# Patient Record
Sex: MALE | Race: WHITE | NOT HISPANIC OR LATINO | Employment: FULL TIME | URBAN - METROPOLITAN AREA
[De-identification: names, ages, dates, MRNs, and addresses within clinical notes are randomized per-mention and may not be internally consistent; named-entity substitution may affect disease eponyms.]

---

## 2021-12-27 ENCOUNTER — OFFICE VISIT (OUTPATIENT)
Dept: URGENT CARE | Facility: CLINIC | Age: 31
End: 2021-12-27
Payer: COMMERCIAL

## 2021-12-27 VITALS
DIASTOLIC BLOOD PRESSURE: 87 MMHG | WEIGHT: 240 LBS | SYSTOLIC BLOOD PRESSURE: 138 MMHG | RESPIRATION RATE: 18 BRPM | HEIGHT: 72 IN | HEART RATE: 67 BPM | OXYGEN SATURATION: 98 % | BODY MASS INDEX: 32.51 KG/M2 | TEMPERATURE: 98 F

## 2021-12-27 DIAGNOSIS — S39.012A STRAIN OF MUSCLE, FASCIA AND TENDON OF LOWER BACK, INITIAL ENCOUNTER: ICD-10-CM

## 2021-12-27 DIAGNOSIS — M54.9 BACK PAIN, UNSPECIFIED BACK LOCATION, UNSPECIFIED BACK PAIN LATERALITY, UNSPECIFIED CHRONICITY: Primary | ICD-10-CM

## 2021-12-27 PROCEDURE — 3008F PR BODY MASS INDEX (BMI) DOCUMENTED: ICD-10-PCS | Mod: CPTII,S$GLB,,

## 2021-12-27 PROCEDURE — 72070 XR THORACIC SPINE AP LATERAL: ICD-10-PCS | Mod: S$GLB,,, | Performed by: RADIOLOGY

## 2021-12-27 PROCEDURE — 72100 X-RAY EXAM L-S SPINE 2/3 VWS: CPT | Mod: S$GLB,,, | Performed by: RADIOLOGY

## 2021-12-27 PROCEDURE — 3075F SYST BP GE 130 - 139MM HG: CPT | Mod: CPTII,S$GLB,,

## 2021-12-27 PROCEDURE — 96372 PR INJECTION,THERAP/PROPH/DIAG2ST, IM OR SUBCUT: ICD-10-PCS | Mod: S$GLB,,, | Performed by: FAMILY MEDICINE

## 2021-12-27 PROCEDURE — 99203 PR OFFICE/OUTPT VISIT, NEW, LEVL III, 30-44 MIN: ICD-10-PCS | Mod: 25,S$GLB,,

## 2021-12-27 PROCEDURE — 1159F MED LIST DOCD IN RCRD: CPT | Mod: CPTII,S$GLB,,

## 2021-12-27 PROCEDURE — 3079F PR MOST RECENT DIASTOLIC BLOOD PRESSURE 80-89 MM HG: ICD-10-PCS | Mod: CPTII,S$GLB,,

## 2021-12-27 PROCEDURE — 1160F PR REVIEW ALL MEDS BY PRESCRIBER/CLIN PHARMACIST DOCUMENTED: ICD-10-PCS | Mod: CPTII,S$GLB,,

## 2021-12-27 PROCEDURE — 72070 X-RAY EXAM THORAC SPINE 2VWS: CPT | Mod: S$GLB,,, | Performed by: RADIOLOGY

## 2021-12-27 PROCEDURE — 99203 OFFICE O/P NEW LOW 30 MIN: CPT | Mod: 25,S$GLB,,

## 2021-12-27 PROCEDURE — 3079F DIAST BP 80-89 MM HG: CPT | Mod: CPTII,S$GLB,,

## 2021-12-27 PROCEDURE — 1159F PR MEDICATION LIST DOCUMENTED IN MEDICAL RECORD: ICD-10-PCS | Mod: CPTII,S$GLB,,

## 2021-12-27 PROCEDURE — 96372 THER/PROPH/DIAG INJ SC/IM: CPT | Mod: S$GLB,,, | Performed by: FAMILY MEDICINE

## 2021-12-27 PROCEDURE — 3008F BODY MASS INDEX DOCD: CPT | Mod: CPTII,S$GLB,,

## 2021-12-27 PROCEDURE — 1160F RVW MEDS BY RX/DR IN RCRD: CPT | Mod: CPTII,S$GLB,,

## 2021-12-27 PROCEDURE — 3075F PR MOST RECENT SYSTOLIC BLOOD PRESS GE 130-139MM HG: ICD-10-PCS | Mod: CPTII,S$GLB,,

## 2021-12-27 PROCEDURE — 72100 XR LUMBAR SPINE 2 OR 3 VIEWS: ICD-10-PCS | Mod: S$GLB,,, | Performed by: RADIOLOGY

## 2021-12-27 RX ORDER — IBUPROFEN 800 MG/1
800 TABLET ORAL 3 TIMES DAILY
Qty: 21 TABLET | Refills: 0 | Status: SHIPPED | OUTPATIENT
Start: 2021-12-27 | End: 2022-01-03

## 2021-12-27 RX ORDER — KETOROLAC TROMETHAMINE 30 MG/ML
30 INJECTION, SOLUTION INTRAMUSCULAR; INTRAVENOUS
Status: COMPLETED | OUTPATIENT
Start: 2021-12-27 | End: 2021-12-27

## 2021-12-27 RX ORDER — TIZANIDINE 4 MG/1
4 TABLET ORAL EVERY 6 HOURS PRN
Qty: 20 TABLET | Refills: 0 | Status: SHIPPED | OUTPATIENT
Start: 2021-12-27 | End: 2022-01-01

## 2021-12-27 RX ADMIN — KETOROLAC TROMETHAMINE 30 MG: 30 INJECTION, SOLUTION INTRAMUSCULAR; INTRAVENOUS at 05:12

## 2022-01-08 ENCOUNTER — LAB VISIT (OUTPATIENT)
Dept: PRIMARY CARE CLINIC | Facility: OTHER | Age: 32
End: 2022-01-08
Attending: INTERNAL MEDICINE
Payer: COMMERCIAL

## 2022-01-08 DIAGNOSIS — Z11.52 ENCOUNTER FOR SCREENING FOR COVID-19: Primary | ICD-10-CM

## 2022-01-08 PROCEDURE — U0003 INFECTIOUS AGENT DETECTION BY NUCLEIC ACID (DNA OR RNA); SEVERE ACUTE RESPIRATORY SYNDROME CORONAVIRUS 2 (SARS-COV-2) (CORONAVIRUS DISEASE [COVID-19]), AMPLIFIED PROBE TECHNIQUE, MAKING USE OF HIGH THROUGHPUT TECHNOLOGIES AS DESCRIBED BY CMS-2020-01-R: HCPCS | Performed by: INTERNAL MEDICINE

## 2022-01-08 NOTE — PROGRESS NOTES
Subjective:       Patient ID: Danish Guzman is a 31 y.o. male.    Chief Complaint: No chief complaint on file.    HPI  Review of Systems    Objective:      Physical Exam    Assessment:       1. Encounter for screening for COVID-19           swab collected, consent given     Plan:

## 2022-01-10 LAB
SARS-COV-2 RNA RESP QL NAA+PROBE: NOT DETECTED
SARS-COV-2- CYCLE NUMBER: NORMAL

## 2023-03-16 ENCOUNTER — HOSPITAL ENCOUNTER (EMERGENCY)
Facility: HOSPITAL | Age: 33
Discharge: HOME | End: 2023-03-17
Attending: STUDENT IN AN ORGANIZED HEALTH CARE EDUCATION/TRAINING PROGRAM
Payer: COMMERCIAL

## 2023-03-16 VITALS
BODY MASS INDEX: 32.51 KG/M2 | HEIGHT: 72 IN | WEIGHT: 240 LBS | OXYGEN SATURATION: 99 % | SYSTOLIC BLOOD PRESSURE: 148 MMHG | HEART RATE: 62 BPM | TEMPERATURE: 98 F | DIASTOLIC BLOOD PRESSURE: 63 MMHG | RESPIRATION RATE: 16 BRPM

## 2023-03-16 DIAGNOSIS — R07.9 CHEST PAIN: ICD-10-CM

## 2023-03-16 DIAGNOSIS — R00.2 PALPITATIONS: Primary | ICD-10-CM

## 2023-03-16 LAB
ALBUMIN SERPL BCP-MCNC: 4.3 G/DL (ref 3.5–5.2)
ALP SERPL-CCNC: 62 U/L (ref 55–135)
ALT SERPL W/O P-5'-P-CCNC: 40 U/L (ref 10–44)
ANION GAP SERPL CALC-SCNC: 9 MMOL/L (ref 8–16)
AST SERPL-CCNC: 22 U/L (ref 10–40)
BASOPHILS # BLD AUTO: 0.05 K/UL (ref 0–0.2)
BASOPHILS NFR BLD: 0.6 % (ref 0–1.9)
BILIRUB SERPL-MCNC: 0.5 MG/DL (ref 0.1–1)
BILIRUB UR QL STRIP: NEGATIVE
BNP SERPL-MCNC: <10 PG/ML (ref 0–99)
BUN SERPL-MCNC: 14 MG/DL (ref 6–20)
CALCIUM SERPL-MCNC: 9.5 MG/DL (ref 8.7–10.5)
CHLORIDE SERPL-SCNC: 104 MMOL/L (ref 95–110)
CLARITY UR REFRACT.AUTO: CLEAR
CO2 SERPL-SCNC: 25 MMOL/L (ref 23–29)
COLOR UR AUTO: YELLOW
CREAT SERPL-MCNC: 0.8 MG/DL (ref 0.5–1.4)
D DIMER PPP IA.FEU-MCNC: <0.19 MG/L FEU
DIFFERENTIAL METHOD: NORMAL
EOSINOPHIL # BLD AUTO: 0.2 K/UL (ref 0–0.5)
EOSINOPHIL NFR BLD: 2.3 % (ref 0–8)
ERYTHROCYTE [DISTWIDTH] IN BLOOD BY AUTOMATED COUNT: 12 % (ref 11.5–14.5)
EST. GFR  (NO RACE VARIABLE): >60 ML/MIN/1.73 M^2
GLUCOSE SERPL-MCNC: 98 MG/DL (ref 70–110)
GLUCOSE UR QL STRIP: NEGATIVE
HCT VFR BLD AUTO: 43.9 % (ref 40–54)
HCV AB SERPL QL IA: NORMAL
HGB BLD-MCNC: 15.1 G/DL (ref 14–18)
HGB UR QL STRIP: NEGATIVE
HIV 1+2 AB+HIV1 P24 AG SERPL QL IA: NORMAL
IMM GRANULOCYTES # BLD AUTO: 0.03 K/UL (ref 0–0.04)
IMM GRANULOCYTES NFR BLD AUTO: 0.3 % (ref 0–0.5)
KETONES UR QL STRIP: NEGATIVE
LEUKOCYTE ESTERASE UR QL STRIP: NEGATIVE
LYMPHOCYTES # BLD AUTO: 2.3 K/UL (ref 1–4.8)
LYMPHOCYTES NFR BLD: 25.8 % (ref 18–48)
MAGNESIUM SERPL-MCNC: 1.7 MG/DL (ref 1.6–2.6)
MCH RBC QN AUTO: 29.3 PG (ref 27–31)
MCHC RBC AUTO-ENTMCNC: 34.4 G/DL (ref 32–36)
MCV RBC AUTO: 85 FL (ref 82–98)
MONOCYTES # BLD AUTO: 0.6 K/UL (ref 0.3–1)
MONOCYTES NFR BLD: 7 % (ref 4–15)
NEUTROPHILS # BLD AUTO: 5.7 K/UL (ref 1.8–7.7)
NEUTROPHILS NFR BLD: 64 % (ref 38–73)
NITRITE UR QL STRIP: NEGATIVE
NRBC BLD-RTO: 0 /100 WBC
PH UR STRIP: 6 [PH] (ref 5–8)
PLATELET # BLD AUTO: 233 K/UL (ref 150–450)
PMV BLD AUTO: 10.1 FL (ref 9.2–12.9)
POTASSIUM SERPL-SCNC: 3.7 MMOL/L (ref 3.5–5.1)
PROT SERPL-MCNC: 7.9 G/DL (ref 6–8.4)
PROT UR QL STRIP: NEGATIVE
RBC # BLD AUTO: 5.16 M/UL (ref 4.6–6.2)
SODIUM SERPL-SCNC: 138 MMOL/L (ref 136–145)
SP GR UR STRIP: 1.01 (ref 1–1.03)
TROPONIN I SERPL DL<=0.01 NG/ML-MCNC: <0.006 NG/ML (ref 0–0.03)
TSH SERPL DL<=0.005 MIU/L-ACNC: 1.75 UIU/ML (ref 0.4–4)
URN SPEC COLLECT METH UR: NORMAL
WBC # BLD AUTO: 8.98 K/UL (ref 3.9–12.7)

## 2023-03-16 PROCEDURE — 86803 HEPATITIS C AB TEST: CPT | Performed by: STUDENT IN AN ORGANIZED HEALTH CARE EDUCATION/TRAINING PROGRAM

## 2023-03-16 PROCEDURE — 80053 COMPREHEN METABOLIC PANEL: CPT | Performed by: STUDENT IN AN ORGANIZED HEALTH CARE EDUCATION/TRAINING PROGRAM

## 2023-03-16 PROCEDURE — 85025 COMPLETE CBC W/AUTO DIFF WBC: CPT | Performed by: STUDENT IN AN ORGANIZED HEALTH CARE EDUCATION/TRAINING PROGRAM

## 2023-03-16 PROCEDURE — 93010 ELECTROCARDIOGRAM REPORT: CPT | Mod: ,,, | Performed by: INTERNAL MEDICINE

## 2023-03-16 PROCEDURE — 99285 EMERGENCY DEPT VISIT HI MDM: CPT | Mod: 25

## 2023-03-16 PROCEDURE — 81003 URINALYSIS AUTO W/O SCOPE: CPT | Performed by: STUDENT IN AN ORGANIZED HEALTH CARE EDUCATION/TRAINING PROGRAM

## 2023-03-16 PROCEDURE — 99284 PR EMERGENCY DEPT VISIT,LEVEL IV: ICD-10-PCS | Mod: ,,, | Performed by: STUDENT IN AN ORGANIZED HEALTH CARE EDUCATION/TRAINING PROGRAM

## 2023-03-16 PROCEDURE — 83880 ASSAY OF NATRIURETIC PEPTIDE: CPT | Performed by: STUDENT IN AN ORGANIZED HEALTH CARE EDUCATION/TRAINING PROGRAM

## 2023-03-16 PROCEDURE — 87389 HIV-1 AG W/HIV-1&-2 AB AG IA: CPT | Performed by: STUDENT IN AN ORGANIZED HEALTH CARE EDUCATION/TRAINING PROGRAM

## 2023-03-16 PROCEDURE — 93005 ELECTROCARDIOGRAM TRACING: CPT

## 2023-03-16 PROCEDURE — 93010 EKG 12-LEAD: ICD-10-PCS | Mod: ,,, | Performed by: INTERNAL MEDICINE

## 2023-03-16 PROCEDURE — 83735 ASSAY OF MAGNESIUM: CPT | Performed by: STUDENT IN AN ORGANIZED HEALTH CARE EDUCATION/TRAINING PROGRAM

## 2023-03-16 PROCEDURE — 85379 FIBRIN DEGRADATION QUANT: CPT | Performed by: STUDENT IN AN ORGANIZED HEALTH CARE EDUCATION/TRAINING PROGRAM

## 2023-03-16 PROCEDURE — 84443 ASSAY THYROID STIM HORMONE: CPT | Performed by: STUDENT IN AN ORGANIZED HEALTH CARE EDUCATION/TRAINING PROGRAM

## 2023-03-16 PROCEDURE — 84484 ASSAY OF TROPONIN QUANT: CPT | Performed by: STUDENT IN AN ORGANIZED HEALTH CARE EDUCATION/TRAINING PROGRAM

## 2023-03-16 PROCEDURE — 99284 EMERGENCY DEPT VISIT MOD MDM: CPT | Mod: ,,, | Performed by: STUDENT IN AN ORGANIZED HEALTH CARE EDUCATION/TRAINING PROGRAM

## 2023-03-17 LAB — TROPONIN I SERPL DL<=0.01 NG/ML-MCNC: <0.006 NG/ML (ref 0–0.03)

## 2023-03-17 NOTE — DISCHARGE INSTRUCTIONS
Please follow up with your primary care doctor. If your symptoms worsen or you have any concerns, you should return to the Emergency Department for re-evaluation. A cardiology referral has also been placed for you today. You should receive a call to schedule a follow up appointment.

## 2023-03-17 NOTE — ED PROVIDER NOTES
"Encounter Date: 3/16/2023       History     Chief Complaint   Patient presents with    Chest Pain     Episode on Friday, pressure in midsternal chest, +dizziness. "Felt like panic attack." Episode resolved on it own. Today had another episode today, similar to friday, this time reports feeling SOB. Reports palpitations.      HPI  Patient is a 32-year-old male with no significant past medical history who presents for chest pain and palpitations.  Patient states that his symptoms have been intermittent for the past week.  He states that the symptoms usually resolve after a couple minutes.  He states that the 1st time this happened he did feel lightheaded but symptoms resolved by 5 minutes.  He has had this happen a few more times with the most recent being prior to arrival.  Today his symptoms lasted longer than usual but by the time he arrived to the emergency department he does report that it is mostly resolved.  He only had lightheadedness with the 1st episode.  He states that it feels more like a pressure in his chest and that his heart is racing.  He does state that he has been checking his heart rate during these episodes and that is usually 60-80 beats per minute.  No associated shortness of breath.  He denies new medications and drug use.  He states that he drinks about 2-3 cups of coffee every day but has not had an increase in his caffeine intake.  No family history of sudden cardiac death.    Review of patient's allergies indicates:  No Known Allergies  History reviewed. No pertinent past medical history.  Past Surgical History:   Procedure Laterality Date    TONSILLECTOMY Bilateral      History reviewed. No pertinent family history.  Social History     Tobacco Use    Smoking status: Never    Smokeless tobacco: Never   Substance Use Topics    Alcohol use: Yes     Alcohol/week: 3.0 standard drinks     Types: 1 Glasses of wine, 1 Cans of beer, 1 Shots of liquor per week     Comment: OCCASIONALLY    Drug use: " Never     Review of Systems  Constitutional: No fever, no chills  HENT: No sore throat  Eyes: No eye pain  Respiratory: No shortness of breath, no cough  Cardiovascular:  Positive chest pain, positive palpitations  Gastrointestinal: No abdominal pain, no nausea, no vomiting, no diarrhea  Genitourinary: No dysuria  Musculoskeletal: No back pain  Neurological: No headache  Psychiatric: No agitation     Physical Exam     Initial Vitals   BP Pulse Resp Temp SpO2   03/16/23 2004 03/16/23 2004 03/16/23 2004 03/16/23 2006 03/16/23 2004   (!) 140/81 72 15 98.1 °F (36.7 °C) 98 %      MAP       --                Physical Exam  Constitutional: No acute distress, well appear  Respiratory: Non-labored, lungs clear  Cardiovascular: Well perfused, normal rate, regular rhythm  Gastrointestinal: Soft, non-tender, non-distended  Integumentary: Warm and dry  Musculoskeletal: No deformity, moving all extremities well  Neurological: Awake and alert  Psychiatric: Cooperative       ED Course   Procedures  Labs Reviewed   HIV 1 / 2 ANTIBODY    Narrative:     Release to patient->Immediate   HEPATITIS C ANTIBODY    Narrative:     Release to patient->Immediate   CBC W/ AUTO DIFFERENTIAL   COMPREHENSIVE METABOLIC PANEL   B-TYPE NATRIURETIC PEPTIDE   D DIMER, QUANTITATIVE   MAGNESIUM   TROPONIN I   TSH   URINALYSIS, REFLEX TO URINE CULTURE    Narrative:     Specimen Source->Urine   TROPONIN I          Imaging Results              X-Ray Chest PA And Lateral (Final result)  Result time 03/16/23 23:08:26      Final result by Magdy Walker MD (03/16/23 23:08:26)                   Impression:      No acute process.      Electronically signed by: Magdy Walker MD  Date:    03/16/2023  Time:    23:08               Narrative:    EXAMINATION:  XR CHEST PA AND LATERAL    CLINICAL HISTORY:  Chest pain, unspecified    TECHNIQUE:  PA and lateral views of the chest were performed.    COMPARISON:  None    FINDINGS:  The trachea is unremarkable.  The  cardiomediastinal silhouette is within normal limits.  The hilar structures are unremarkable.  There is no evidence of free air beneath the hemidiaphragms.  There are no pleural effusions.  There is no evidence of a pneumothorax.  There is no evidence of pneumomediastinum.  No airspace opacity is present.  The osseous structures are unremarkable.                                       Medications - No data to display  Medical Decision Making:   Clinical Tests:   Lab Tests: Ordered and Reviewed  Radiological Study: Ordered and Reviewed  Medical Tests: Ordered and Reviewed  ED Management:  Patient is a previous healthy 32-year-old male who presents for intermittent chest pain and palpitations.  EKG here shows normal sinus rhythm, rate 66, no red me a or STEMI.  Symptoms have resolved upon arrival.  Labs ordered to assess for possible etiologies of palpitations and chest discomfort.  Chest x-ray also ordered.  Will continue to monitor in the emergency department.  Anticipate discharge home with cardiology referral if workup negative.    Labs and imaging reviewed and reassuring. Patient resting comfortably on re-evaluation.  He is instructed to follow up with his primary care doctor and also provided a cardiology referral.  He was counseled on return precautions prior to discharge.  Patient was comfortable discharge home.           ED Course as of 03/17/23 0150   Thu Mar 16, 2023   2134 EKG with normal sinus rhythm, rate 66, no STEMI.  EKG independently interpreted by me. [NN]      ED Course User Index  [NN] Shilpa Damon MD                 Clinical Impression:   Final diagnoses:  [R07.9] Chest pain  [R00.2] Palpitations (Primary)        ED Disposition Condition    Discharge Stable          ED Prescriptions    None       Follow-up Information       Follow up With Specialties Details Why Contact Info Additional Information    Chetan Garcia [facility] Schedule an appointment as soon as possible for a  visit   [address] [id number] [phone] [facility] is located across the street from the [facility], behind the [facility], and between the Pediatrics and [facility] facilities on [address] [facility]. [facility] - Emergency Dept Emergency Medicine  As needed, If symptoms worsen [address] [id number] [phone]     [facility] - Cardiology - [facility] Cardiology Schedule an appointment as soon as possible for a visit   [address] [id number] [phone] Cardiology Services Clinics - 3rd floor             Shilpa Damon MD  03/17/23 0153

## 2023-03-17 NOTE — ED TRIAGE NOTES
"Chest has been feeling "wanky" since Friday, with chills and goosebumps and feeling tingly. States chest tightness, with sob, no n/v/d nor headaches or blurred vision.     No past medical history on file.    No past surgical history on file.    No family history on file.    Social History     Socioeconomic History    Marital status: Single   Tobacco Use    Smoking status: Never    Smokeless tobacco: Never   Substance and Sexual Activity    Alcohol use: Yes     Comment: OCCASIONALLY    Drug use: Never    Sexual activity: Not Currently       No current facility-administered medications for this encounter.     No current outpatient medications on file.       Review of patient's allergies indicates:  No Known Allergies   "

## 2023-03-20 ENCOUNTER — TELEPHONE (OUTPATIENT)
Dept: PRIMARY CARE CLINIC | Facility: CLINIC | Age: 33
End: 2023-03-20
Payer: COMMERCIAL

## 2023-03-20 NOTE — TELEPHONE ENCOUNTER
----- Message from Caryn Wong sent at 3/17/2023  5:17 PM CDT -----  Type:  Needs Medical Advice    Who Called: pt  Symptoms (please be specific): pt needs to be scheduled for appt  to est care   Would the patient rather a call back or a response via [employer]? call  Best Call Back Number: [phone]  Additional Information:

## 2023-03-21 ENCOUNTER — TELEPHONE (OUTPATIENT)
Dept: INTERNAL MEDICINE | Facility: CLINIC | Age: 33
End: 2023-03-21
Payer: COMMERCIAL

## 2023-03-21 NOTE — TELEPHONE ENCOUNTER
Received a message that pt wanted to schedule a np est care visit , pt is scheduled to see a provider 4/4 to establish care . He declines an appointment with  at this time

## 2023-03-21 NOTE — TELEPHONE ENCOUNTER
----- Message from Ursula Ferguson LPN sent at 3/21/2023 10:28 AM CDT -----  Regarding: Referral  Good morning,     Can someone please contact the pt to schedule an appt to est.care.      Thanks,  Ursula

## 2023-04-03 NOTE — PROGRESS NOTES
Subjective     Patient ID: aDnish Guzman is a 32 y.o. male.    Chief Complaint: Annual Exam and Establish Care    Pt new to me, here to establish care    Previous PCP--has not had one    Went to ER on 3-16-23 for palpitations and chest pain, cardiac workup negative. Told to f/u with PCP and Cardiology. I have independently reviewed the ER documentation from this visit. Still has the chest pain 2-3 times a day with or without exertion.    Review of Systems   Constitutional:  Negative for activity change, appetite change and unexpected weight change.   HENT:  Negative for hearing loss and voice change.    Eyes:  Negative for visual disturbance.   Respiratory:  Negative for apnea, cough, chest tightness and shortness of breath.    Cardiovascular:  Positive for chest pain. Negative for palpitations and leg swelling.        Seen in ER on 3/16/23, cardiac workup, happens 2-3 times a day   Gastrointestinal:  Negative for abdominal distention, abdominal pain, blood in stool, constipation, diarrhea, nausea, vomiting and reflux.   Endocrine: Negative for cold intolerance, heat intolerance, polydipsia, polyphagia and polyuria.   Genitourinary:  Negative for difficulty urinating, dysuria and penile pain.   Musculoskeletal:  Negative for arthralgias and myalgias.   Integumentary:  Negative for color change, pallor, rash and wound.   Allergic/Immunologic: Negative for environmental allergies and food allergies.   Neurological:  Positive for numbness. Negative for dizziness, weakness, light-headedness and headaches.   Hematological:  Negative for adenopathy. Does not bruise/bleed easily.   Psychiatric/Behavioral:  Negative for agitation, behavioral problems, sleep disturbance and suicidal ideas.      Review of patient's allergies indicates:  No Known Allergies     No current outpatient medications on file.     There is no problem list on file for this patient.     History reviewed. No pertinent past medical history.     Past  Surgical History:   Procedure Laterality Date    TONSILLECTOMY Bilateral       Social History     Socioeconomic History    Marital status: Single   Tobacco Use    Smoking status: Never    Smokeless tobacco: Never   Substance and Sexual Activity    Alcohol use: Yes     Alcohol/week: 3.0 standard drinks     Types: 1 Glasses of wine, 1 Cans of beer, 1 Shots of liquor per week     Comment: OCCASIONALLY    Drug use: Never    Sexual activity: Yes     Partners: Female      Family History   Problem Relation Age of Onset    Diabetes Mother     Stroke Mother     No Known Problems Father          Objective   Vitals:    04/04/23 0808   BP: 120/70   Pulse: 71   SpO2: 98%   Weight: 109.6 kg (241 lb 10 oz)   Height: 6' (1.829 m)   PainSc:   3   PainLoc: Chest      Body mass index is 32.77 kg/m².     Physical Exam  Vitals and nursing note reviewed.   Constitutional:       Appearance: He is well-developed. He is obese.   HENT:      Head: Normocephalic.      Right Ear: Tympanic membrane, ear canal and external ear normal. There is no impacted cerumen.      Left Ear: Tympanic membrane, ear canal and external ear normal. There is no impacted cerumen.      Nose: Nose normal.      Mouth/Throat:      Mouth: Mucous membranes are moist.      Pharynx: Oropharynx is clear.   Eyes:      General: Lids are normal. Lids are everted, no foreign bodies appreciated.      Extraocular Movements: Extraocular movements intact.      Conjunctiva/sclera: Conjunctivae normal.      Pupils: Pupils are equal, round, and reactive to light.   Neck:      Vascular: No carotid bruit or JVD.      Trachea: Trachea normal.   Cardiovascular:      Rate and Rhythm: Normal rate and regular rhythm.      Pulses: Normal pulses.      Heart sounds: Normal heart sounds.   Pulmonary:      Effort: Pulmonary effort is normal.      Breath sounds: Normal breath sounds.   Abdominal:      General: Abdomen is flat. Bowel sounds are normal.      Palpations: Abdomen is soft.    Musculoskeletal:         General: Normal range of motion.      Cervical back: Full passive range of motion without pain, normal range of motion and neck supple.   Skin:     General: Skin is warm and dry.      Capillary Refill: Capillary refill takes less than 2 seconds.   Neurological:      General: No focal deficit present.      Mental Status: He is alert and oriented to person, place, and time.   Psychiatric:         Mood and Affect: Mood normal.         Speech: Speech normal.         Behavior: Behavior normal.         Thought Content: Thought content normal.         Judgment: Judgment normal.          Assessment and Plan     Problem List Items Addressed This Visit    None  Visit Diagnoses       Encounter for health maintenance examination    -  Primary    Routine lab draw        Relevant Orders    LIPID PANEL    CBC W/ AUTO DIFFERENTIAL    COMPREHENSIVE METABOLIC PANEL    TSH    Screening cholesterol level        Relevant Orders    LIPID PANEL    Need for Tdap vaccination        Relevant Orders    (In Office Administered) Tdap Vaccine    Encounter to establish care with new doctor        BMI 32.0-32.9,adult        Obesity (BMI 30-39.9)        Chest pain, unspecified type        Relevant Orders    Ambulatory referral/consult to Cardiology            Danish was seen today for annual exam and establish care.    Diagnoses and all orders for this visit:    Encounter for health maintenance examination  Annual wellness exam completed.    All medications, histories, and concerns reviewed, reconciled, and addressed.    Appropriate Screenings per pt's sex and age have been reviewed and discussed with pt.    BMI reviewed.    Routine lab draw  -     LIPID PANEL; Future  -     CBC W/ AUTO DIFFERENTIAL; Future  -     COMPREHENSIVE METABOLIC PANEL; Future  -     TSH; Future    Screening cholesterol level  -     LIPID PANEL; Future    Need for Tdap vaccination  -     (In Office Administered) Tdap Vaccine    Encounter to  establish care with new doctor  Fasting lab orders, will call with results, if results ok, RTC in 1 yr for annual or sooner prn with one of MDs I work with who can be your new PCP: Dr. Ml Joe, Dr. Kaley Johnston, Dr. Joseph Wong    BMI 32.0-32.9,adult  BMI reviewed    Obesity (BMI 30-39.9)  BMI reviewed.    Diet and exercise to lose weight.    Chest pain, unspecified type  -     Ambulatory referral/consult to Cardiology; Future    Fasting lab orders, will call with results, if results ok, RTC in 1 yr for annual or sooner prn with one of MDs I work with who can be your new PCP: Dr. Ml Joe, Dr. Kaley Johnston, Dr. Joseph Wong    Tdap vaccine    Cardiology referral for chest pain    Follow up in about 1 year (around 4/4/2024) for annual or sooner as needed with one of MDs recommended on AVS.

## 2023-04-04 ENCOUNTER — LAB VISIT (OUTPATIENT)
Dept: LAB | Facility: HOSPITAL | Age: 33
End: 2023-04-04
Payer: COMMERCIAL

## 2023-04-04 ENCOUNTER — OFFICE VISIT (OUTPATIENT)
Dept: INTERNAL MEDICINE | Facility: CLINIC | Age: 33
End: 2023-04-04
Payer: COMMERCIAL

## 2023-04-04 VITALS
SYSTOLIC BLOOD PRESSURE: 120 MMHG | OXYGEN SATURATION: 98 % | HEART RATE: 71 BPM | DIASTOLIC BLOOD PRESSURE: 70 MMHG | BODY MASS INDEX: 32.73 KG/M2 | HEIGHT: 72 IN | WEIGHT: 241.63 LBS

## 2023-04-04 DIAGNOSIS — Z01.89 ROUTINE LAB DRAW: ICD-10-CM

## 2023-04-04 DIAGNOSIS — R07.9 CHEST PAIN, UNSPECIFIED TYPE: ICD-10-CM

## 2023-04-04 DIAGNOSIS — Z76.89 ENCOUNTER TO ESTABLISH CARE WITH NEW DOCTOR: ICD-10-CM

## 2023-04-04 DIAGNOSIS — Z23 NEED FOR TDAP VACCINATION: ICD-10-CM

## 2023-04-04 DIAGNOSIS — Z13.220 SCREENING CHOLESTEROL LEVEL: ICD-10-CM

## 2023-04-04 DIAGNOSIS — E66.9 OBESITY (BMI 30-39.9): ICD-10-CM

## 2023-04-04 DIAGNOSIS — Z00.00 ENCOUNTER FOR HEALTH MAINTENANCE EXAMINATION: Primary | ICD-10-CM

## 2023-04-04 LAB
ALBUMIN SERPL BCP-MCNC: 3.9 G/DL (ref 3.5–5.2)
ALP SERPL-CCNC: 52 U/L (ref 55–135)
ALT SERPL W/O P-5'-P-CCNC: 50 U/L (ref 10–44)
ANION GAP SERPL CALC-SCNC: 10 MMOL/L (ref 8–16)
AST SERPL-CCNC: 27 U/L (ref 10–40)
BASOPHILS # BLD AUTO: 0.06 K/UL (ref 0–0.2)
BASOPHILS NFR BLD: 0.8 % (ref 0–1.9)
BILIRUB SERPL-MCNC: 0.6 MG/DL (ref 0.1–1)
BUN SERPL-MCNC: 14 MG/DL (ref 6–20)
CALCIUM SERPL-MCNC: 9.4 MG/DL (ref 8.7–10.5)
CHLORIDE SERPL-SCNC: 103 MMOL/L (ref 95–110)
CHOLEST SERPL-MCNC: 182 MG/DL (ref 120–199)
CHOLEST/HDLC SERPL: 3 {RATIO} (ref 2–5)
CO2 SERPL-SCNC: 25 MMOL/L (ref 23–29)
CREAT SERPL-MCNC: 0.8 MG/DL (ref 0.5–1.4)
DIFFERENTIAL METHOD: ABNORMAL
EOSINOPHIL # BLD AUTO: 0.4 K/UL (ref 0–0.5)
EOSINOPHIL NFR BLD: 4.4 % (ref 0–8)
ERYTHROCYTE [DISTWIDTH] IN BLOOD BY AUTOMATED COUNT: 12.5 % (ref 11.5–14.5)
EST. GFR  (NO RACE VARIABLE): >60 ML/MIN/1.73 M^2
GLUCOSE SERPL-MCNC: 94 MG/DL (ref 70–110)
HCT VFR BLD AUTO: 43.5 % (ref 40–54)
HDLC SERPL-MCNC: 60 MG/DL (ref 40–75)
HDLC SERPL: 33 % (ref 20–50)
HGB BLD-MCNC: 14.6 G/DL (ref 14–18)
IMM GRANULOCYTES # BLD AUTO: 0.05 K/UL (ref 0–0.04)
IMM GRANULOCYTES NFR BLD AUTO: 0.6 % (ref 0–0.5)
LDLC SERPL CALC-MCNC: 106.8 MG/DL (ref 63–159)
LYMPHOCYTES # BLD AUTO: 2.3 K/UL (ref 1–4.8)
LYMPHOCYTES NFR BLD: 29.6 % (ref 18–48)
MCH RBC QN AUTO: 28.9 PG (ref 27–31)
MCHC RBC AUTO-ENTMCNC: 33.6 G/DL (ref 32–36)
MCV RBC AUTO: 86 FL (ref 82–98)
MONOCYTES # BLD AUTO: 0.6 K/UL (ref 0.3–1)
MONOCYTES NFR BLD: 7 % (ref 4–15)
NEUTROPHILS # BLD AUTO: 4.5 K/UL (ref 1.8–7.7)
NEUTROPHILS NFR BLD: 57.6 % (ref 38–73)
NONHDLC SERPL-MCNC: 122 MG/DL
NRBC BLD-RTO: 0 /100 WBC
PLATELET # BLD AUTO: 230 K/UL (ref 150–450)
PMV BLD AUTO: 10.6 FL (ref 9.2–12.9)
POTASSIUM SERPL-SCNC: 4.1 MMOL/L (ref 3.5–5.1)
PROT SERPL-MCNC: 7.1 G/DL (ref 6–8.4)
RBC # BLD AUTO: 5.05 M/UL (ref 4.6–6.2)
SODIUM SERPL-SCNC: 138 MMOL/L (ref 136–145)
TRIGL SERPL-MCNC: 76 MG/DL (ref 30–150)
TSH SERPL DL<=0.005 MIU/L-ACNC: 1.67 UIU/ML (ref 0.4–4)
WBC # BLD AUTO: 7.87 K/UL (ref 3.9–12.7)

## 2023-04-04 PROCEDURE — 3074F PR MOST RECENT SYSTOLIC BLOOD PRESSURE < 130 MM HG: ICD-10-PCS | Mod: CPTII,S$GLB,, | Performed by: NURSE PRACTITIONER

## 2023-04-04 PROCEDURE — 80053 COMPREHEN METABOLIC PANEL: CPT | Performed by: NURSE PRACTITIONER

## 2023-04-04 PROCEDURE — 99999 PR PBB SHADOW E&M-EST. PATIENT-LVL IV: CPT | Mod: PBBFAC,,, | Performed by: NURSE PRACTITIONER

## 2023-04-04 PROCEDURE — 99385 PREV VISIT NEW AGE 18-39: CPT | Mod: 25,S$GLB,, | Performed by: NURSE PRACTITIONER

## 2023-04-04 PROCEDURE — 3008F PR BODY MASS INDEX (BMI) DOCUMENTED: ICD-10-PCS | Mod: CPTII,S$GLB,, | Performed by: NURSE PRACTITIONER

## 2023-04-04 PROCEDURE — 99385 PR PREVENTIVE VISIT,NEW,18-39: ICD-10-PCS | Mod: 25,S$GLB,, | Performed by: NURSE PRACTITIONER

## 2023-04-04 PROCEDURE — 3078F PR MOST RECENT DIASTOLIC BLOOD PRESSURE < 80 MM HG: ICD-10-PCS | Mod: CPTII,S$GLB,, | Performed by: NURSE PRACTITIONER

## 2023-04-04 PROCEDURE — 84443 ASSAY THYROID STIM HORMONE: CPT | Performed by: NURSE PRACTITIONER

## 2023-04-04 PROCEDURE — 3074F SYST BP LT 130 MM HG: CPT | Mod: CPTII,S$GLB,, | Performed by: NURSE PRACTITIONER

## 2023-04-04 PROCEDURE — 80061 LIPID PANEL: CPT | Performed by: NURSE PRACTITIONER

## 2023-04-04 PROCEDURE — 1159F PR MEDICATION LIST DOCUMENTED IN MEDICAL RECORD: ICD-10-PCS | Mod: CPTII,S$GLB,, | Performed by: NURSE PRACTITIONER

## 2023-04-04 PROCEDURE — 90471 TDAP VACCINE GREATER THAN OR EQUAL TO 7YO IM: ICD-10-PCS | Mod: S$GLB,,, | Performed by: NURSE PRACTITIONER

## 2023-04-04 PROCEDURE — 90715 TDAP VACCINE 7 YRS/> IM: CPT | Mod: S$GLB,,, | Performed by: NURSE PRACTITIONER

## 2023-04-04 PROCEDURE — 90471 IMMUNIZATION ADMIN: CPT | Mod: S$GLB,,, | Performed by: NURSE PRACTITIONER

## 2023-04-04 PROCEDURE — 85025 COMPLETE CBC W/AUTO DIFF WBC: CPT | Performed by: NURSE PRACTITIONER

## 2023-04-04 PROCEDURE — 36415 COLL VENOUS BLD VENIPUNCTURE: CPT | Performed by: NURSE PRACTITIONER

## 2023-04-04 PROCEDURE — 3078F DIAST BP <80 MM HG: CPT | Mod: CPTII,S$GLB,, | Performed by: NURSE PRACTITIONER

## 2023-04-04 PROCEDURE — 3008F BODY MASS INDEX DOCD: CPT | Mod: CPTII,S$GLB,, | Performed by: NURSE PRACTITIONER

## 2023-04-04 PROCEDURE — 90715 TDAP VACCINE GREATER THAN OR EQUAL TO 7YO IM: ICD-10-PCS | Mod: S$GLB,,, | Performed by: NURSE PRACTITIONER

## 2023-04-04 PROCEDURE — 99999 PR PBB SHADOW E&M-EST. PATIENT-LVL IV: ICD-10-PCS | Mod: PBBFAC,,, | Performed by: NURSE PRACTITIONER

## 2023-04-04 PROCEDURE — 1159F MED LIST DOCD IN RCRD: CPT | Mod: CPTII,S$GLB,, | Performed by: NURSE PRACTITIONER

## 2023-04-04 NOTE — PATIENT INSTRUCTIONS
Fasting lab orders, will call with results, if results ok, RTC in 1 yr for annual or sooner prn with one of MDs I work with who can be your new PCP: Dr. Ml Joe, Dr. Kaley Johnston, Dr. Joseph Wong    Tdap vaccine    Cardiology referral for chest pain

## 2023-04-06 ENCOUNTER — OFFICE VISIT (OUTPATIENT)
Dept: CARDIOLOGY | Facility: CLINIC | Age: 33
End: 2023-04-06
Payer: COMMERCIAL

## 2023-04-06 VITALS
HEIGHT: 72 IN | BODY MASS INDEX: 31.83 KG/M2 | SYSTOLIC BLOOD PRESSURE: 129 MMHG | DIASTOLIC BLOOD PRESSURE: 77 MMHG | WEIGHT: 235 LBS | RESPIRATION RATE: 20 BRPM | HEART RATE: 73 BPM

## 2023-04-06 DIAGNOSIS — R07.9 CHEST PAIN: ICD-10-CM

## 2023-04-06 DIAGNOSIS — R07.9 CHEST PAIN, UNSPECIFIED TYPE: ICD-10-CM

## 2023-04-06 DIAGNOSIS — R00.2 PALPITATIONS: ICD-10-CM

## 2023-04-06 PROCEDURE — 3074F PR MOST RECENT SYSTOLIC BLOOD PRESSURE < 130 MM HG: ICD-10-PCS | Mod: CPTII,S$GLB,, | Performed by: INTERNAL MEDICINE

## 2023-04-06 PROCEDURE — 1160F PR REVIEW ALL MEDS BY PRESCRIBER/CLIN PHARMACIST DOCUMENTED: ICD-10-PCS | Mod: CPTII,S$GLB,, | Performed by: INTERNAL MEDICINE

## 2023-04-06 PROCEDURE — 3078F DIAST BP <80 MM HG: CPT | Mod: CPTII,S$GLB,, | Performed by: INTERNAL MEDICINE

## 2023-04-06 PROCEDURE — 99999 PR PBB SHADOW E&M-EST. PATIENT-LVL III: ICD-10-PCS | Mod: PBBFAC,,, | Performed by: INTERNAL MEDICINE

## 2023-04-06 PROCEDURE — 1159F PR MEDICATION LIST DOCUMENTED IN MEDICAL RECORD: ICD-10-PCS | Mod: CPTII,S$GLB,, | Performed by: INTERNAL MEDICINE

## 2023-04-06 PROCEDURE — 1159F MED LIST DOCD IN RCRD: CPT | Mod: CPTII,S$GLB,, | Performed by: INTERNAL MEDICINE

## 2023-04-06 PROCEDURE — 99204 OFFICE O/P NEW MOD 45 MIN: CPT | Mod: S$GLB,,, | Performed by: INTERNAL MEDICINE

## 2023-04-06 PROCEDURE — 99204 PR OFFICE/OUTPT VISIT, NEW, LEVL IV, 45-59 MIN: ICD-10-PCS | Mod: S$GLB,,, | Performed by: INTERNAL MEDICINE

## 2023-04-06 PROCEDURE — 1160F RVW MEDS BY RX/DR IN RCRD: CPT | Mod: CPTII,S$GLB,, | Performed by: INTERNAL MEDICINE

## 2023-04-06 PROCEDURE — 3008F PR BODY MASS INDEX (BMI) DOCUMENTED: ICD-10-PCS | Mod: CPTII,S$GLB,, | Performed by: INTERNAL MEDICINE

## 2023-04-06 PROCEDURE — 99999 PR PBB SHADOW E&M-EST. PATIENT-LVL III: CPT | Mod: PBBFAC,,, | Performed by: INTERNAL MEDICINE

## 2023-04-06 PROCEDURE — 3074F SYST BP LT 130 MM HG: CPT | Mod: CPTII,S$GLB,, | Performed by: INTERNAL MEDICINE

## 2023-04-06 PROCEDURE — 3008F BODY MASS INDEX DOCD: CPT | Mod: CPTII,S$GLB,, | Performed by: INTERNAL MEDICINE

## 2023-04-06 PROCEDURE — 3078F PR MOST RECENT DIASTOLIC BLOOD PRESSURE < 80 MM HG: ICD-10-PCS | Mod: CPTII,S$GLB,, | Performed by: INTERNAL MEDICINE

## 2023-04-06 NOTE — PROGRESS NOTES
HISTORY:    33 yo M w no significant PMH presenting for initial evaluation by me.    Pt reports a recent CP syndrome. 1st episode March 9th. Described as a hard heart beat with palpitations followed by some chest discomfort and a feeling of swelling in his throat. Symptoms usually last minutes at a time. He has to stop what he;'s doing. Always self-resolving. Occurring every couple of days since. No SOB or CUEVA.    Pt reports very good exercise levels at baseline. Walks his dogs in the mornings and runs in his evening. 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     The patient denies any previous history of myocardial infarction, coronary artery disease, peripheral arterial disease, stroke, congestive heart failure, or cardiomyopathy. Nonsmoker.      PHYSICAL EXAM:    Vitals:    04/06/23 0834   BP: 129/77   Pulse: 73   Resp: 20       NAD, A+Ox3.  No jvd, no bruit.  RRR nml s1,s2. No murmurs.  CTA B no wheezes or crackles.  No edema.    LABS/STUDIES (imaging reviewed during clinic visit):    April 2023 CBC and CMP normal.   and HDL 60.  Triglycerides 76. March 2023 Troponin and BNP negative.  TSH normal.    ECG March 2023 sinus rhythm with no Q-waves or ST changes.  Chest x-ray March 2023 clear with normal cardiac borders.      ASSESSMENT & PLAN:    1. Chest pain    2. Palpitations    3. Chest pain, unspecified type        Orders Placed This Encounter    Holter monitor - 48 hour    Echo        Pt with palpitations and some non-specific symptoms. Check a holter and TTE. Pt did have an episode of syncope in [facility] in January in the setting of a considerable amount of hiking and dehydration. No episodes since.     Follow up in about 4 weeks (around 5/4/2023).      Edvin Woodard MD

## 2023-10-05 ENCOUNTER — OFFICE VISIT (OUTPATIENT)
Dept: CARDIOLOGY | Facility: CLINIC | Age: 33
End: 2023-10-05
Payer: COMMERCIAL

## 2023-10-05 VITALS
BODY MASS INDEX: 33.02 KG/M2 | HEIGHT: 72 IN | HEART RATE: 84 BPM | SYSTOLIC BLOOD PRESSURE: 123 MMHG | DIASTOLIC BLOOD PRESSURE: 83 MMHG | WEIGHT: 243.81 LBS

## 2023-10-05 DIAGNOSIS — R00.2 PALPITATIONS: Primary | ICD-10-CM

## 2023-10-05 PROCEDURE — 99999 PR PBB SHADOW E&M-EST. PATIENT-LVL III: CPT | Mod: PBBFAC,,, | Performed by: INTERNAL MEDICINE

## 2023-10-05 PROCEDURE — 1159F PR MEDICATION LIST DOCUMENTED IN MEDICAL RECORD: ICD-10-PCS | Mod: CPTII,S$GLB,, | Performed by: INTERNAL MEDICINE

## 2023-10-05 PROCEDURE — 1160F PR REVIEW ALL MEDS BY PRESCRIBER/CLIN PHARMACIST DOCUMENTED: ICD-10-PCS | Mod: CPTII,S$GLB,, | Performed by: INTERNAL MEDICINE

## 2023-10-05 PROCEDURE — 3008F BODY MASS INDEX DOCD: CPT | Mod: CPTII,S$GLB,, | Performed by: INTERNAL MEDICINE

## 2023-10-05 PROCEDURE — 3074F SYST BP LT 130 MM HG: CPT | Mod: CPTII,S$GLB,, | Performed by: INTERNAL MEDICINE

## 2023-10-05 PROCEDURE — 3079F PR MOST RECENT DIASTOLIC BLOOD PRESSURE 80-89 MM HG: ICD-10-PCS | Mod: CPTII,S$GLB,, | Performed by: INTERNAL MEDICINE

## 2023-10-05 PROCEDURE — 1159F MED LIST DOCD IN RCRD: CPT | Mod: CPTII,S$GLB,, | Performed by: INTERNAL MEDICINE

## 2023-10-05 PROCEDURE — 3079F DIAST BP 80-89 MM HG: CPT | Mod: CPTII,S$GLB,, | Performed by: INTERNAL MEDICINE

## 2023-10-05 PROCEDURE — 99214 PR OFFICE/OUTPT VISIT, EST, LEVL IV, 30-39 MIN: ICD-10-PCS | Mod: S$GLB,,, | Performed by: INTERNAL MEDICINE

## 2023-10-05 PROCEDURE — 99999 PR PBB SHADOW E&M-EST. PATIENT-LVL III: ICD-10-PCS | Mod: PBBFAC,,, | Performed by: INTERNAL MEDICINE

## 2023-10-05 PROCEDURE — 1160F RVW MEDS BY RX/DR IN RCRD: CPT | Mod: CPTII,S$GLB,, | Performed by: INTERNAL MEDICINE

## 2023-10-05 PROCEDURE — 99214 OFFICE O/P EST MOD 30 MIN: CPT | Mod: S$GLB,,, | Performed by: INTERNAL MEDICINE

## 2023-10-05 PROCEDURE — 3074F PR MOST RECENT SYSTOLIC BLOOD PRESSURE < 130 MM HG: ICD-10-PCS | Mod: CPTII,S$GLB,, | Performed by: INTERNAL MEDICINE

## 2023-10-05 PROCEDURE — 3008F PR BODY MASS INDEX (BMI) DOCUMENTED: ICD-10-PCS | Mod: CPTII,S$GLB,, | Performed by: INTERNAL MEDICINE

## 2023-10-05 NOTE — PROGRESS NOTES
HISTORY:    33-year-old male with no cardiac history presenting for follow-up.    Pt initially evaluated for palpitations and chest discomfort in May. Ordered a TTE and holter that did not occur. Symptoms improved and then recurred. More so palpitations described as a hard heart beat with palpitations followed by some chest discomfort. Palpitations lasting an hour. Worst when he lays down at night. No SOB or CUEVA.     Pt reports very good exercise levels at baseline. Walks his dogs in the mornings and runs in his evening. 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 Has backed off running due to concern about symptoms.      The patient denies any previous history of myocardial infarction, coronary artery disease, peripheral arterial disease, stroke, congestive heart failure, or cardiomyopathy. Nonsmoker.       PHYSICAL EXAM:    Vitals:    10/05/23 1500   BP: 123/83   Pulse: 84       NAD, A+Ox3.  No jvd, no bruit.  RRR nml s1,s2. No murmurs.  CTA B no wheezes or crackles.  No edema.    LABS/STUDIES (imaging reviewed during clinic visit):    April 2023 CBC and CMP normal.   and HDL 60.  Triglycerides 76.  BNP and troponin normal.  TSH normal.    March 2023 ECG sinus rhythm no Q-waves or ST changes.    Chest x-ray March 2023 normal cardiac silhouette.  Clear lung fields.        ASSESSMENT & PLAN:    1. Palpitations        Orders Placed This Encounter    Echo        Palpitations with non-specific discomfort. Reschedule TTE/holter and reassess thereafter.     Follow up in about 3 months (around 1/5/2024).      Edvin Woodard MD

## 2023-10-06 ENCOUNTER — PATIENT MESSAGE (OUTPATIENT)
Dept: BEHAVIORAL HEALTH | Facility: CLINIC | Age: 33
End: 2023-10-06
Payer: COMMERCIAL

## 2023-10-06 ENCOUNTER — OFFICE VISIT (OUTPATIENT)
Dept: INTERNAL MEDICINE | Facility: CLINIC | Age: 33
End: 2023-10-06
Payer: COMMERCIAL

## 2023-10-06 VITALS
DIASTOLIC BLOOD PRESSURE: 80 MMHG | HEART RATE: 65 BPM | SYSTOLIC BLOOD PRESSURE: 126 MMHG | WEIGHT: 242.31 LBS | BODY MASS INDEX: 32.82 KG/M2 | HEIGHT: 72 IN | OXYGEN SATURATION: 98 %

## 2023-10-06 DIAGNOSIS — E66.9 OBESITY (BMI 30-39.9): ICD-10-CM

## 2023-10-06 DIAGNOSIS — R00.2 PALPITATIONS: ICD-10-CM

## 2023-10-06 DIAGNOSIS — F51.01 PRIMARY INSOMNIA: ICD-10-CM

## 2023-10-06 DIAGNOSIS — R07.9 CHEST PAIN IN ADULT: Primary | ICD-10-CM

## 2023-10-06 DIAGNOSIS — F32.A MODERATELY SEVERE DEPRESSION: ICD-10-CM

## 2023-10-06 PROCEDURE — 1159F MED LIST DOCD IN RCRD: CPT | Mod: CPTII,S$GLB,, | Performed by: NURSE PRACTITIONER

## 2023-10-06 PROCEDURE — 99999 PR PBB SHADOW E&M-EST. PATIENT-LVL IV: CPT | Mod: PBBFAC,,, | Performed by: NURSE PRACTITIONER

## 2023-10-06 PROCEDURE — 99999 PR PBB SHADOW E&M-EST. PATIENT-LVL IV: ICD-10-PCS | Mod: PBBFAC,,, | Performed by: NURSE PRACTITIONER

## 2023-10-06 PROCEDURE — 3008F BODY MASS INDEX DOCD: CPT | Mod: CPTII,S$GLB,, | Performed by: NURSE PRACTITIONER

## 2023-10-06 PROCEDURE — 1160F RVW MEDS BY RX/DR IN RCRD: CPT | Mod: CPTII,S$GLB,, | Performed by: NURSE PRACTITIONER

## 2023-10-06 PROCEDURE — 1159F PR MEDICATION LIST DOCUMENTED IN MEDICAL RECORD: ICD-10-PCS | Mod: CPTII,S$GLB,, | Performed by: NURSE PRACTITIONER

## 2023-10-06 PROCEDURE — 3074F PR MOST RECENT SYSTOLIC BLOOD PRESSURE < 130 MM HG: ICD-10-PCS | Mod: CPTII,S$GLB,, | Performed by: NURSE PRACTITIONER

## 2023-10-06 PROCEDURE — 1160F PR REVIEW ALL MEDS BY PRESCRIBER/CLIN PHARMACIST DOCUMENTED: ICD-10-PCS | Mod: CPTII,S$GLB,, | Performed by: NURSE PRACTITIONER

## 2023-10-06 PROCEDURE — 3008F PR BODY MASS INDEX (BMI) DOCUMENTED: ICD-10-PCS | Mod: CPTII,S$GLB,, | Performed by: NURSE PRACTITIONER

## 2023-10-06 PROCEDURE — 3079F DIAST BP 80-89 MM HG: CPT | Mod: CPTII,S$GLB,, | Performed by: NURSE PRACTITIONER

## 2023-10-06 PROCEDURE — 99214 PR OFFICE/OUTPT VISIT, EST, LEVL IV, 30-39 MIN: ICD-10-PCS | Mod: S$GLB,,, | Performed by: NURSE PRACTITIONER

## 2023-10-06 PROCEDURE — 3079F PR MOST RECENT DIASTOLIC BLOOD PRESSURE 80-89 MM HG: ICD-10-PCS | Mod: CPTII,S$GLB,, | Performed by: NURSE PRACTITIONER

## 2023-10-06 PROCEDURE — 99214 OFFICE O/P EST MOD 30 MIN: CPT | Mod: S$GLB,,, | Performed by: NURSE PRACTITIONER

## 2023-10-06 PROCEDURE — 3074F SYST BP LT 130 MM HG: CPT | Mod: CPTII,S$GLB,, | Performed by: NURSE PRACTITIONER

## 2023-10-06 RX ORDER — TRAZODONE HYDROCHLORIDE 50 MG/1
50 TABLET ORAL NIGHTLY
Qty: 30 TABLET | Refills: 0 | Status: SHIPPED | OUTPATIENT
Start: 2023-10-06 | End: 2023-10-30

## 2023-10-06 NOTE — PATIENT INSTRUCTIONS
Advised to complete workup per Cardiology for the chest pain that was ordered on yesterday by them during that visit    [facility]. Message me in 4 weeks or call me to let me know how your mood and sleep are doing. Be sure to take pill to allow 8 hrs of sleep. If you do well on this will send additional refills once you check in with me in 4 weeks    Referral to Behavioral Health.    You can call [phone] to schedule your appt with Psychiatry or Behavioral Health per my referral.

## 2023-10-06 NOTE — PROGRESS NOTES
"Subjective     Patient ID: Danish Guzman is a 33 y.o. male.    Chief Complaint: Chest Pain and Insomnia    Pt here for, "my heartbeat". Also Complains of insomnia. Normally sleeps 5 hrs a night. He tried otc Nyquil and Tylenol PM and that resulted in 8-9 hours of sleep. He has avoided caffeine. Also would like to see a therapist for possible stress and depression. Works during the day and sometimes night. Shifts rotate at times. Works 5 days a week, 10 hr shifts. Denies excess snoring at night.    Saw Cardiology on yesterday for a follow up for same issue. Plan of care was the following:  ASSESSMENT & PLAN:   1. Palpitations    Orders Placed This Encounter  · Echo     Palpitations with non-specific discomfort. Reschedule TTE/holter and reassess thereafter.      Follow up in about 3 months (around 1/5/2024).      Edvin Woodard MD    Prior to, I saw pt on 4-4-23 for annual and a follow up on CP from an ER visit in March. His annual labs were stable. I referred him to Cardiology then for the chest pain. He saw them also on 4-6-23 and did not complete the workup. Their plan of care then was the following:  ASSESSMENT & PLAN:  1. Chest pain   2. Palpitations   3. Chest pain, unspecified type      Orders Placed This Encounter  · Holter monitor - 48 hour  · Echo     Pt with palpitations and some non-specific symptoms. Check a holter and TTE. Pt did have an episode of syncope in [facility] in January in the setting of a considerable amount of hiking and dehydration. No episodes since.      Follow up in about 4 weeks (around 5/4/2023).      Edvin Woodard MD      Review of Systems   Constitutional:  Negative for chills, fatigue and fever.   Respiratory:  Negative for chest tightness and shortness of breath.    Cardiovascular:  Positive for chest pain and palpitations.        As documented in HPI     Gastrointestinal:  Negative for abdominal pain, constipation, diarrhea and vomiting.   Genitourinary:  Negative for dysuria. "   Musculoskeletal:  Negative for arthralgias, gait problem and myalgias.   Allergic/Immunologic: Negative for environmental allergies, food allergies and frequent infections.   Neurological:  Negative for dizziness, syncope, weakness, light-headedness, numbness and headaches.   Hematological:  Negative for adenopathy. Does not bruise/bleed easily.   Psychiatric/Behavioral:  Positive for dysphoric mood and sleep disturbance.         As documented in HPI       Review of patient's allergies indicates:  No Known Allergies    No current outpatient medications on file.    There is no problem list on file for this patient.      No past medical history on file.    Past Surgical History:   Procedure Laterality Date    TONSILLECTOMY Bilateral        Social History     Socioeconomic History    Marital status: Single   Tobacco Use    Smoking status: Never    Smokeless tobacco: Never   Substance and Sexual Activity    Alcohol use: Yes     Alcohol/week: 3.0 standard drinks of alcohol     Types: 1 Glasses of wine, 1 Cans of beer, 1 Shots of liquor per week     Comment: OCCASIONALLY    Drug use: Never    Sexual activity: Yes     Partners: Female       Family History   Problem Relation Age of Onset    Diabetes Mother     Stroke Mother     No Known Problems Father        Objective     Vitals:    10/06/23 0805   BP: 126/80   Pulse: 65   SpO2: 98%   Weight: 109.9 kg (242 lb 4.6 oz)   Height: 6' (1.829 m)   PainSc:   1     Body mass index is 32.86 kg/m².    Physical Exam  Vitals and nursing note reviewed.   Constitutional:       Appearance: He is obese.   HENT:      Head: Normocephalic.      Mouth/Throat:      Mouth: Mucous membranes are moist.   Eyes:      Conjunctiva/sclera: Conjunctivae normal.   Neck:      Thyroid: No thyroid mass, thyromegaly or thyroid tenderness.   Cardiovascular:      Rate and Rhythm: Normal rate and regular rhythm.      Pulses: Normal pulses.      Heart sounds: No murmur heard.     No gallop.   Pulmonary:       Effort: Pulmonary effort is normal.      Breath sounds: Normal breath sounds.   Musculoskeletal:         General: Normal range of motion.      Cervical back: Normal range of motion.   Skin:     General: Skin is warm and dry.      Capillary Refill: Capillary refill takes less than 2 seconds.   Neurological:      General: No focal deficit present.      Mental Status: He is alert and oriented to person, place, and time.   Psychiatric:         Mood and Affect: Mood is depressed. Affect is flat.         Behavior: Behavior normal.         Thought Content: Thought content normal.         Judgment: Judgment normal.             10/6/2023     8:15 AM 4/6/2023     8:35 AM 4/4/2023     8:08 AM   Depression Patient Health Questionnaire   Over the last two weeks how often have you been bothered by little interest or pleasure in doing things Several days Not at all Not at all   Over the last two weeks how often have you been bothered by feeling down, depressed or hopeless More than half the days Not at all Not at all   PHQ-2 Total Score 3 0 0   Over the last two weeks how often have you been bothered by trouble falling or staying asleep, or sleeping too much Nearly every day     Over the last two weeks how often have you been bothered by feeling tired or having little energy Nearly every day     Over the last two weeks how often have you been bothered by a poor appetite or overeating Several days     Over the last two weeks how often have you been bothered by feeling bad about yourself - or that you are a failure or have let yourself or your family down Nearly every day     Over the last two weeks how often have you been bothered by trouble concentrating on things, such as reading the newspaper or watching television Several days     Over the last two weeks how often have you been bothered by moving or speaking so slowly that other people could have noticed. Or the opposite - being so fidgety or restless that you have been moving  around a lot more than usual. Not at all     Over the last two weeks how often have you been bothered by thoughts that you would be better off dead, or of hurting yourself Several days     If you checked off any problems, how difficult have these problems made it for you to do your work, take care of things at home or get along with other people? Somewhat difficult     PHQ-9 Score 15     PHQ-9 Interpretation Moderately Severe              Assessment and Plan     1. Chest pain in adult  2. Palpitations  Advised to complete workup per Cardiology for the chest pain that was ordered on yesterday by them during that visit    3. Primary insomnia  -     Ambulatory referral/consult to Primary Care Behavioral Health (Non-Opioids); Future; Expected date: 10/06/2023  -     traZODone (DESYREL) 50 MG tablet; Take 1 tablet (50 mg total) by mouth every evening.  Dispense: 30 tablet; Refill: 0    4. Moderately severe depression  -     Ambulatory referral/consult to Primary Care Behavioral Health (Non-Opioids); Future; Expected date: 10/06/2023  -     traZODone (DESYREL) 50 MG tablet; Take 1 tablet (50 mg total) by mouth every evening.  Dispense: 30 tablet; Refill: 0    5. BMI 32.0-32.9,adult    6. Obesity (BMI 30-39.9)    Start trazodone 50mg at nighit for sleep and depression. Message me in 4 weeks or call me to let me know how your mood and sleep are doing. Be sure to take pill to allow 8 hrs of sleep. If you do well on this will send additional refills once you check in with me in 4 weeks    Referral to Behavioral Health.    You can call [phone] to schedule your appt with Psychiatry or Behavioral Health per my referral.    Self care instructions provided in AVS    Follow up if symptoms worsen or fail to improve.

## 2023-10-10 ENCOUNTER — TELEPHONE (OUTPATIENT)
Dept: BEHAVIORAL HEALTH | Facility: CLINIC | Age: 33
End: 2023-10-10
Payer: COMMERCIAL

## 2023-10-10 NOTE — PROGRESS NOTES
Behavioral Health Community Health Worker  Initial Assessment  Completed by:  Juliana Middleton    Date:  10/10/2023    Patient Enrollment in Behavioral Health Program:  Patient verbalized understanding of Behavioral Health Integration services to include:  Patient understands that CHW, LCSW, PharmD and consulting Psychiatrist are members of the care team working collaboratively with his/her primary care provider: Yes  Patient understands that activation of their [facility] patient portal account is required for accessing the full scope of team services: Yes  Patient understands that some counseling sessions may occur via video: Yes  Clinic visits with the psychiatrist may be subject to a co-pay based on your insurance: Yes  Patient consents to enroll in BHI program: Yes    Assessments     Single Item Health Literacy Scale:  How often do you need to have someone help you read instructions, pamphlets or other written material from your doctor or pharmacy?: Never    Promis 10:  Promis 10 Responses  In general, would you say your health is: Good  In general, would you say your quality of life is: Good  In general, how would you rate your physical health?: Good  In general, how would you rate your mental health, including your mood and your ability to think?: Good  In general, how would you rate your satisfaction with your social activities and relationships?: Fair  In general, please rate how well you carry out your usual social activities and roles. (This includes activities at home, at work and in your community, and responsibilities as a parent, child, spouse, employee, friend, etc.): Good  To what extent are you able to carry out your everyday physical activities such as walking, climbing stairs, carrying groceries, or moving a chair? : Completely  How often have you been bothered by emotional problems such as feeling anxious, depressed or irritable?: Often  In the past 7 days, how would you rate your fatigue on  average?: Moderate  In the past 7 days, on a scale of 0 to 10 (where 0 is no pain and 10 is the worst pain imaginable) how would you rate your pain on average?: 4  Global Physical Health: 15  Global Mental health Score: 12    Depression PHQ:      10/10/2023    10:57 AM 10/6/2023     8:15 AM 4/6/2023     8:35 AM 4/4/2023     8:08 AM   PHQ-9 Depression Patient Health Questionnaire   Over the last two weeks how often have you been bothered by little interest or pleasure in doing things 2 1 0 0   Over the last two weeks how often have you been bothered by feeling down, depressed or hopeless 1 2 0 0   Over the last two weeks how often have you been bothered by trouble falling or staying asleep, or sleeping too much 3 3     Over the last two weeks how often have you been bothered by feeling tired or having little energy 1 3     Over the last two weeks how often have you been bothered by a poor appetite or overeating 3 1     Over the last two weeks how often have you been bothered by feeling bad about yourself - or that you are a failure or have let yourself or your family down 2 3     Over the last two weeks how often have you been bothered by trouble concentrating on things, such as reading the newspaper or watching television 1 1     Over the last two weeks how often have you been bothered by moving or speaking so slowly that other people could have noticed. 0 0     Over the last two weeks how often have you been bothered by thoughts that you would be better off dead, or of hurting yourself 0 1     If you checked off any problems, how difficult have these problems made it for you to do your work, take care of things at home or get along with other people? Somewhat difficult Somewhat difficult     PHQ-9 Score 13 15            Generalized Anxiety Disorder 7-Item Scale:      10/10/2023    11:01 AM   GAD7   1. Feeling nervous, anxious, or on edge? 1   2. Not being able to stop or control worrying? 2   3. Worrying too much  "about different things? 2   4. Trouble relaxing? 1   5. Being so restless that it is hard to sit still? 2   6. Becoming easily annoyed or irritable? 1   7. Feeling afraid as if something awful might happen? 3   8. If you checked off any problems, how difficult have these problems made it for you to do your work, take care of things at home, or get along with other people? 1   MYLES-7 Score 12       History     Social History     Socioeconomic History    Marital status: Single   Tobacco Use    Smoking status: Never    Smokeless tobacco: Never   Substance and Sexual Activity    Alcohol use: Yes     Alcohol/week: 3.0 standard drinks of alcohol     Types: 1 Glasses of wine, 1 Cans of beer, 1 Shots of liquor per week     Comment: OCCASIONALLY    Drug use: Never    Sexual activity: Yes     Partners: Female       Call Summary   Patient was referred to the BHI (Non-opioid) program by Primary Care Provider, Dr. Stern CHW contacted Danish Guzman who reports depression that limits [his/her] activities of daily living (ADLs).   Patient scored "15" on the PHQ9 and "12" on the MYLES 7. Based on these scores patient is eligible for the Behavioral health Integration (Non-opioid) Program. TERIW completed the intake and scheduled an appointment for patient with Jo Florence LCSW, on 10/24/23.          "

## 2023-10-24 ENCOUNTER — OFFICE VISIT (OUTPATIENT)
Dept: BEHAVIORAL HEALTH | Facility: CLINIC | Age: 33
End: 2023-10-24
Payer: COMMERCIAL

## 2023-10-24 DIAGNOSIS — F41.0 PANIC DISORDER: ICD-10-CM

## 2023-10-24 DIAGNOSIS — F51.01 PRIMARY INSOMNIA: ICD-10-CM

## 2023-10-24 DIAGNOSIS — F32.1 MDD (MAJOR DEPRESSIVE DISORDER), SINGLE EPISODE, MODERATE: Primary | ICD-10-CM

## 2023-10-24 PROCEDURE — 90791 PR PSYCHIATRIC DIAGNOSTIC EVALUATION: ICD-10-PCS | Mod: S$GLB,,,

## 2023-10-24 PROCEDURE — 90791 PSYCH DIAGNOSTIC EVALUATION: CPT | Mod: S$GLB,,,

## 2023-10-24 PROCEDURE — 99999 PR PBB SHADOW E&M-EST. PATIENT-LVL I: ICD-10-PCS | Mod: PBBFAC,,,

## 2023-10-24 PROCEDURE — 99999 PR PBB SHADOW E&M-EST. PATIENT-LVL I: CPT | Mod: PBBFAC,,,

## 2023-10-24 NOTE — PROGRESS NOTES
Primary Care Behavioral Health Integration: Initial  Date:  10/24/2023  Referral Source:  Dory Stern DNP  Type of Visit:  In person  Length of Appointment: 45 minutes   .  History of Present Illness:  Danish Guzman, a 33 y.o. male with history of depression referred by Dory Stern DNP.  Patient was seen, examined and chart was reviewed.    Met with patient.     Pt was seen for an initial evaluation. Pt reported that he was seeking therapy due to having chest pain. Pt reported that he had been having chest pain since March 2023. Pt reported being concerned as his family had a history of strokes. Pt reported that he had several tests done to determine the cause of his chest pain; however, all tests came back normal. Pt reported having no change in the amount of stress that he had been under. Pt reported having experienced the following symptoms in March to include chest pain, pounding heart, numbness and tingling sensations, dizziness, and difficulty catching his breath. Pt also reported that he had experience blurred vision. Pt also reported having chest pain at night, which contributed to difficulty sleeping. Pt reported when having the chest pains, it is difficult to stay focused as he would have thoughts about his health including dying. Pt reported that these chest pains happen randomly. Pt reported that he found that running water and tapping his fingers helped to calm him down. SW educated the pt on panic attacks and the associated symptoms. 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 Pt reported being  for 7 years with no kids.       Current symptoms:  Depression: dysphoric mood, anhedonia, insomnia, hopelessness, difficulty concentrating, and increased appetite.  Anxiety: panic attacks.  Sleep: non-restful sleep and difficulty going back to sleep after awakening .  [place]:  denies.  Psychosis: denies .    Risk assessment:  Patient reports no suicidal  ideation  Patient reports no homicidal ideation  Patient reports no self-injurious behavior  Patient reports no violent behavior    Patient advised to call [phone] or present the the nearest ED if they experience suicidal or homicidal ideation, plan or intent.      No past medical history on file.      Current Outpatient Medications:     traZODone (DESYREL) 50 MG tablet, Take 1 tablet (50 mg total) by mouth every evening., Disp: 30 tablet, Rfl: 0    Psychiatric History:  Is the patient taking psychiatric medication: Yes  Pt was currently taking Trazodone 50 mg, for which he found helpful with sleep. They may be interested in medication changes.  Previous Medication Trials: No  Previous Psychiatric Outpatient Treatment:  No  Previous Psychiatric Hospitalizations:  No  Previous Suicide Attempts:  No  History of Trauma:  No  Access to a Firearm:  Yes, pt confirmed firearm was in a safe at parents house.    Substance Use History:  Tobacco/Nicotine:  No   Alcohol: 1-2 times a week (5-6 drinks)  Illicit Substances: No  Misuse of Prescription Medications:  No  Caffeine: Yes - Coffee (1 cup) per day, which was a decrease from 4 cups.     Mental Status Exam  General Appearance:  unremarkable, age appropriate, casually dressed   Speech: normal tone, normal rate, normal pitch, normal volume      Level of Cooperation: cooperative      Thought Processes: normal and logical   Mood: Appropriate       Thought Content: normal, no suicidality, no homicidality, delusions, or paranoia   Affect: congruent and appropriate   Orientation: Oriented x3   Memory: recent >  intact, remote >  intact   Attention Span & Concentration: intact   Fund of General Knowledge: intact and appropriate to age and level of education   Abstract Reasoning: intact   Judgment & Insight: fair     Language  intact           10/10/2023    11:01 AM   GAD7   1. Feeling nervous, anxious, or on edge? 1   2. Not being able to stop or control worrying? 2   3. Worrying  too much about different things? 2   4. Trouble relaxing? 1   5. Being so restless that it is hard to sit still? 2   6. Becoming easily annoyed or irritable? 1   7. Feeling afraid as if something awful might happen? 3   8. If you checked off any problems, how difficult have these problems made it for you to do your work, take care of things at home, or get along with other people? 1   MYLES-7 Score 12        Impression:   My diagnostic impression is Anxiety disorders; panic disorder [F41.0] and MAJOR DEPRESSIVE DISORDER, SINGLE EPISODE, Moderate (F32.1).       Treatment Goals and Plan: Initial appointment focused on gathering history, identifying treatment goals and developing a treatment plan.      Depression: acquiring relapse prevention skills  Panic: acquiring breathing skills, acquiring relapse prevention skills, and reducing physical symptoms of anxiety/panic    SW will send to the pt via [facility] the following material: What Is Depression; What Are Panic Attacks.    Future treatment will utilize CBT, Mindfulness Techniques, and Relaxation Techniques .      Return to Clinic: 2 weeks

## 2023-10-25 ENCOUNTER — PATIENT MESSAGE (OUTPATIENT)
Dept: BEHAVIORAL HEALTH | Facility: CLINIC | Age: 33
End: 2023-10-25
Payer: COMMERCIAL

## 2023-10-27 NOTE — PROGRESS NOTES
Patient discussed during [facility] meeting today, 10/27/2023.  Patient with poorly controlled depression and anxiety.  He currently undergoing Cardiology workup for chest pain and palpitations (like due to anxiety).  He is considering option of medication.  I would recommend Zoloft 25mg daily x 1 week and then 50mg daily.  LCSW will discuss with him at next appointment.  He is also taking Trazodone 50mg qHS for insomnia and this has been helpful.    Time: 5 minutes    The above treatment considerations and suggestions are based on consultations with the patients Behavioral Health Integration therapist and a review of information available in the patient's chart.  I have not personally examined the patient.  All recommendations should be implemented with consideration of the patients relevant prior history and current clinical status.  It remains the responsibility of the patient's Primary Care Physician to prescribe medications and to educate the patient on risks versus benefits, alternative treatments, side effect profile and the inherent unpredictability of individual responses to medications.  Please feel free to call me with any questions about the care of this patient.

## 2023-11-03 ENCOUNTER — CLINICAL SUPPORT (OUTPATIENT)
Dept: CARDIOLOGY | Facility: HOSPITAL | Age: 33
End: 2023-11-03
Attending: INTERNAL MEDICINE
Payer: COMMERCIAL

## 2023-11-03 DIAGNOSIS — R00.2 PALPITATIONS: ICD-10-CM

## 2023-11-03 PROCEDURE — 93226 XTRNL ECG REC<48 HR SCAN A/R: CPT

## 2023-11-03 PROCEDURE — 93227 XTRNL ECG REC<48 HR R&I: CPT | Mod: ,,, | Performed by: INTERNAL MEDICINE

## 2023-11-03 PROCEDURE — 93227 HOLTER MONITOR - 48 HOUR (CUPID ONLY): ICD-10-PCS | Mod: ,,, | Performed by: INTERNAL MEDICINE

## 2023-11-10 ENCOUNTER — PATIENT MESSAGE (OUTPATIENT)
Dept: BEHAVIORAL HEALTH | Facility: CLINIC | Age: 33
End: 2023-11-10

## 2023-11-10 ENCOUNTER — OFFICE VISIT (OUTPATIENT)
Dept: BEHAVIORAL HEALTH | Facility: CLINIC | Age: 33
End: 2023-11-10
Payer: COMMERCIAL

## 2023-11-10 DIAGNOSIS — F41.0 PANIC DISORDER: Primary | ICD-10-CM

## 2023-11-10 DIAGNOSIS — F32.1 MDD (MAJOR DEPRESSIVE DISORDER), SINGLE EPISODE, MODERATE: ICD-10-CM

## 2023-11-10 PROCEDURE — 90832 PR PSYCHOTHERAPY W/PATIENT, 30 MIN: ICD-10-PCS | Mod: S$GLB,,,

## 2023-11-10 PROCEDURE — 90832 PSYTX W PT 30 MINUTES: CPT | Mod: S$GLB,,,

## 2023-11-10 NOTE — PROGRESS NOTES
Individual Psychotherapy (MATTHEW/BOYDW/PhD)  Danish Guzman,  11/10/2023    Site:  [facility]         Therapeutic Intervention: Met with patient for individual psychotherapy.    Chief complaint/reason for encounter: depression and anxiety       Interval history and content of current session:  Pt was last seen on 10/24/23 for an initial evaluation. Pt presented with a pleasant affect and mood. Pt reported that he completed the holter monitor test and was awaiting results. Pt reported that he also had an echo test scheduled for 11/29/23. SW informed pt that the medication, Zoloft, was recommended by psychiatrist, Dr. Parham, to aid in improvement of depression and anxiety. Pt reported that at this time he would like to wait until he finishes his upcoming test and appointment with cardiologist in January 2024 before starting medication and continuing therapy. Pt reported that he still had episodes of chest pain; although was unable to identify any triggers. Pt reported that deep breathing helped some with the pain; however, taking a shower or running water had been very helpful. SONNY reviewed with the pt the following material sent: What Is Depression; What Are Panic Attacks.     Pt will contact the I program to reschedule or notify his PCP should another referral be needed.     Treatment plan:  Target symptoms: depression, anxiety   Why chosen therapy is appropriate versus another modality: relevant to diagnosis  Outcome monitoring methods: self-report, observation  Therapeutic intervention type: behavior modifying psychotherapy, supportive psychotherapy    Risk parameters:  Patient reports no suicidal ideation  Patient reports no homicidal ideation  Patient reports no self-injurious behavior  Patient reports no violent behavior    Verbal deficits: None    Patient's response to intervention:  The patient's response to intervention is accepting.    Progress toward goals and other mental status changes:  The  patient's progress toward goals is good.    Patient advised to call [phone] or present the the nearest ED if they experience suicidal or homicidal ideation, plan or intent.            10/10/2023    11:01 AM   GAD7   1. Feeling nervous, anxious, or on edge? 1   2. Not being able to stop or control worrying? 2   3. Worrying too much about different things? 2   4. Trouble relaxing? 1   5. Being so restless that it is hard to sit still? 2   6. Becoming easily annoyed or irritable? 1   7. Feeling afraid as if something awful might happen? 3   8. If you checked off any problems, how difficult have these problems made it for you to do your work, take care of things at home, or get along with other people? 1   MYLES-7 Score 12       Diagnosis:   Major Depressive Disorder, Single Episode, Moderate (F32.1); Panic Disorder (F41.0)    Plan: Pt agreed to contact the I program to reschedule or notify his PCP should another referral be needed.     SW will send to the pt via [facility] the following material: Panic Assessment.     Return to clinic:  N/A    Length of Service (minutes):  23 minutes

## 2023-11-13 LAB
OHS CV EVENT MONITOR DAY: 0
OHS CV HOLTER LENGTH DECIMAL HOURS: 48
OHS CV HOLTER LENGTH HOURS: 48
OHS CV HOLTER LENGTH MINUTES: 0
OHS CV HOLTER SINUS AVERAGE HR: 86
OHS CV HOLTER SINUS MAX HR: 141
OHS CV HOLTER SINUS MIN HR: 54

## 2023-11-27 ENCOUNTER — HOSPITAL ENCOUNTER (OUTPATIENT)
Dept: CARDIOLOGY | Facility: HOSPITAL | Age: 33
Discharge: HOME | End: 2023-11-27
Attending: INTERNAL MEDICINE
Payer: COMMERCIAL

## 2023-11-27 VITALS
SYSTOLIC BLOOD PRESSURE: 125 MMHG | DIASTOLIC BLOOD PRESSURE: 75 MMHG | HEIGHT: 72 IN | WEIGHT: 242 LBS | HEART RATE: 70 BPM | BODY MASS INDEX: 32.78 KG/M2

## 2023-11-27 DIAGNOSIS — R00.2 PALPITATIONS: ICD-10-CM

## 2023-11-27 LAB
ASCENDING AORTA: 2.88 CM
AV INDEX (PROSTH): 0.89
AV MEAN GRADIENT: 5 MMHG
AV PEAK GRADIENT: 9 MMHG
AV VALVE AREA BY VELOCITY RATIO: 2.99 CM²
AV VALVE AREA: 3.15 CM²
AV VELOCITY RATIO: 0.85
BSA FOR ECHO PROCEDURE: 2.36 M2
CV ECHO LV RWT: 0.29 CM
DOP CALC AO PEAK VEL: 1.5 M/S
DOP CALC AO VTI: 32.18 CM
DOP CALC LVOT AREA: 3.5 CM2
DOP CALC LVOT DIAMETER: 2.12 CM
DOP CALC LVOT PEAK VEL: 1.27 M/S
DOP CALC LVOT STROKE VOLUME: 101.22 CM3
DOP CALCLVOT PEAK VEL VTI: 28.69 CM
E WAVE DECELERATION TIME: 216.73 MSEC
E/A RATIO: 2.61
E/E' RATIO: 5.37 M/S
ECHO LV POSTERIOR WALL: 0.82 CM (ref 0.6–1.1)
FRACTIONAL SHORTENING: 33 % (ref 28–44)
INTERVENTRICULAR SEPTUM: 0.78 CM (ref 0.6–1.1)
LA MAJOR: 4.48 CM
LA MINOR: 4.24 CM
LA WIDTH: 3.24 CM
LEFT ATRIUM SIZE: 3.7 CM
LEFT ATRIUM VOLUME INDEX MOD: 18.6 ML/M2
LEFT ATRIUM VOLUME INDEX: 19.2 ML/M2
LEFT ATRIUM VOLUME MOD: 42.99 CM3
LEFT ATRIUM VOLUME: 44.39 CM3
LEFT INTERNAL DIMENSION IN SYSTOLE: 3.8 CM (ref 2.1–4)
LEFT VENTRICLE DIASTOLIC VOLUME INDEX: 68.53 ML/M2
LEFT VENTRICLE DIASTOLIC VOLUME: 158.31 ML
LEFT VENTRICLE MASS INDEX: 73 G/M2
LEFT VENTRICLE SYSTOLIC VOLUME INDEX: 26.9 ML/M2
LEFT VENTRICLE SYSTOLIC VOLUME: 62.06 ML
LEFT VENTRICULAR INTERNAL DIMENSION IN DIASTOLE: 5.67 CM (ref 3.5–6)
LEFT VENTRICULAR MASS: 168.63 G
LV LATERAL E/E' RATIO: 4.7 M/S
LV SEPTAL E/E' RATIO: 6.27 M/S
MV A" WAVE DURATION": 15.98 MSEC
MV PEAK A VEL: 0.36 M/S
MV PEAK E VEL: 0.94 M/S
MV STENOSIS PRESSURE HALF TIME: 62.85 MS
MV VALVE AREA P 1/2 METHOD: 3.5 CM2
PISA TR MAX VEL: 2.38 M/S
PULM VEIN S/D RATIO: 1.1
PV PEAK D VEL: 0.41 M/S
PV PEAK S VEL: 0.45 M/S
RA MAJOR: 4.24 CM
RA PRESSURE ESTIMATED: 3 MMHG
RA WIDTH: 3.12 CM
RIGHT VENTRICULAR END-DIASTOLIC DIMENSION: 3.24 CM
RV TB RVSP: 5 MMHG
SINUS: 2.6 CM
STJ: 2.64 CM
TDI LATERAL: 0.2 M/S
TDI SEPTAL: 0.15 M/S
TDI: 0.18 M/S
TR MAX PG: 23 MMHG
TRICUSPID ANNULAR PLANE SYSTOLIC EXCURSION: 2.22 CM
TV REST PULMONARY ARTERY PRESSURE: 26 MMHG
Z-SCORE OF LEFT VENTRICULAR DIMENSION IN END DIASTOLE: -4.71
Z-SCORE OF LEFT VENTRICULAR DIMENSION IN END SYSTOLE: -2.89

## 2023-11-27 PROCEDURE — 93306 ECHO (CUPID ONLY): ICD-10-PCS | Mod: 26,,, | Performed by: INTERNAL MEDICINE

## 2023-11-27 PROCEDURE — 93306 TTE W/DOPPLER COMPLETE: CPT | Mod: 26,,, | Performed by: INTERNAL MEDICINE

## 2023-11-27 PROCEDURE — 93306 TTE W/DOPPLER COMPLETE: CPT

## 2024-01-12 ENCOUNTER — OFFICE VISIT (OUTPATIENT)
Dept: CARDIOLOGY | Facility: CLINIC | Age: 34
End: 2024-01-12
Payer: COMMERCIAL

## 2024-01-12 VITALS
WEIGHT: 255.75 LBS | BODY MASS INDEX: 34.64 KG/M2 | SYSTOLIC BLOOD PRESSURE: 123 MMHG | DIASTOLIC BLOOD PRESSURE: 82 MMHG | HEART RATE: 71 BPM | HEIGHT: 72 IN

## 2024-01-12 DIAGNOSIS — R00.2 PALPITATIONS: Primary | ICD-10-CM

## 2024-01-12 PROCEDURE — 3079F DIAST BP 80-89 MM HG: CPT | Mod: CPTII,S$GLB,, | Performed by: INTERNAL MEDICINE

## 2024-01-12 PROCEDURE — 1159F MED LIST DOCD IN RCRD: CPT | Mod: CPTII,S$GLB,, | Performed by: INTERNAL MEDICINE

## 2024-01-12 PROCEDURE — 99213 OFFICE O/P EST LOW 20 MIN: CPT | Mod: S$GLB,,, | Performed by: INTERNAL MEDICINE

## 2024-01-12 PROCEDURE — 99999 PR PBB SHADOW E&M-EST. PATIENT-LVL III: CPT | Mod: PBBFAC,,, | Performed by: INTERNAL MEDICINE

## 2024-01-12 PROCEDURE — 3074F SYST BP LT 130 MM HG: CPT | Mod: CPTII,S$GLB,, | Performed by: INTERNAL MEDICINE

## 2024-01-12 PROCEDURE — 3008F BODY MASS INDEX DOCD: CPT | Mod: CPTII,S$GLB,, | Performed by: INTERNAL MEDICINE

## 2024-01-12 PROCEDURE — 1160F RVW MEDS BY RX/DR IN RCRD: CPT | Mod: CPTII,S$GLB,, | Performed by: INTERNAL MEDICINE

## 2024-01-12 NOTE — PROGRESS NOTES
HISTORY:    33-year-old male with no cardiac history presenting for follow-up.    Pt initially evaluated for palpitations and chest discomfort in '23. Ordered a TTE and holter that did not occur. Symptoms improved and then recurred. More so palpitations described as a hard heart beat with palpitations followed by some chest discomfort. Palpitations lasting an hour. Worst when he lays down at night. No SOB or CUEVA.    Reordered TTE/holter that was nml. Symptoms improved at this time. Pt believes because he is busier with work.      Pt reports very good exercise levels at baseline. Walks his dogs in the mornings and runs in his evening. 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 Back to running.     The patient denies any previous history of myocardial infarction, coronary artery disease, peripheral arterial disease, stroke, congestive heart failure, or cardiomyopathy. Nonsmoker.       PHYSICAL EXAM:    Vitals:    01/12/24 0956   BP: 123/82   Pulse: 71       NAD, A+Ox3.  No jvd, no bruit.  RRR nml s1,s2. No murmurs.  CTA B no wheezes or crackles.  No edema.    LABS/STUDIES (imaging reviewed during clinic visit):    April 2023 CBC and CMP normal.   and HDL 60.  Triglycerides 76.  BNP and troponin normal.  TSH normal.    March 2023 ECG sinus rhythm no Q-waves or ST changes.    Holter November 2023 average heart rate 86 beats per minute.  No evidence of significant ectopy or arrhythmia.  Multiple episodes of chest discomfort/palpitations/dizziness corresponded with normal sinus rhythm.  TTE November 2023 normal LV size and function.  Normal diastology.  CVP 3.   Chest x-ray March 2023 normal cardiac silhouette.  Clear lung fields.        ASSESSMENT & PLAN:    1. Palpitations                  Palpitations with non-specific discomfort. Non-cardiac, improving.     Follow up if symptoms worsen or fail to improve.      Edvin Woodard MD

## 2024-09-06 ENCOUNTER — OFFICE VISIT (OUTPATIENT)
Dept: URGENT CARE | Facility: CLINIC | Age: 34
End: 2024-09-06
Payer: COMMERCIAL

## 2024-09-06 VITALS
SYSTOLIC BLOOD PRESSURE: 136 MMHG | HEIGHT: 72 IN | DIASTOLIC BLOOD PRESSURE: 81 MMHG | RESPIRATION RATE: 16 BRPM | HEART RATE: 66 BPM | OXYGEN SATURATION: 96 % | TEMPERATURE: 98 F | BODY MASS INDEX: 32.51 KG/M2 | WEIGHT: 240 LBS

## 2024-09-06 DIAGNOSIS — R09.81 NASAL CONGESTION: ICD-10-CM

## 2024-09-06 DIAGNOSIS — J40 BRONCHITIS: Primary | ICD-10-CM

## 2024-09-06 DIAGNOSIS — R06.2 WHEEZING: ICD-10-CM

## 2024-09-06 DIAGNOSIS — R06.02 SHORTNESS OF BREATH: ICD-10-CM

## 2024-09-06 DIAGNOSIS — R05.1 ACUTE COUGH: ICD-10-CM

## 2024-09-06 DIAGNOSIS — R06.89 DECREASED LUNG SOUNDS: ICD-10-CM

## 2024-09-06 DIAGNOSIS — R09.89 CHEST CONGESTION: ICD-10-CM

## 2024-09-06 PROCEDURE — 71046 X-RAY EXAM CHEST 2 VIEWS: CPT | Mod: S$GLB,,, | Performed by: RADIOLOGY

## 2024-09-06 RX ORDER — FLUTICASONE PROPIONATE 50 MCG
2 SPRAY, SUSPENSION (ML) NASAL DAILY PRN
Qty: 15.8 ML | Refills: 0 | Status: SHIPPED | OUTPATIENT
Start: 2024-09-06

## 2024-09-06 RX ORDER — ALBUTEROL SULFATE 90 UG/1
2 INHALANT RESPIRATORY (INHALATION) EVERY 6 HOURS PRN
Qty: 18 G | Refills: 0 | Status: SHIPPED | OUTPATIENT
Start: 2024-09-06 | End: 2025-09-06

## 2024-09-06 RX ORDER — DEXAMETHASONE SODIUM PHOSPHATE 10 MG/ML
10 INJECTION INTRAMUSCULAR; INTRAVENOUS
Status: COMPLETED | OUTPATIENT
Start: 2024-09-06 | End: 2024-09-06

## 2024-09-06 RX ORDER — PREDNISONE 20 MG/1
20 TABLET ORAL DAILY
Qty: 3 TABLET | Refills: 0 | Status: SHIPPED | OUTPATIENT
Start: 2024-09-07 | End: 2024-09-10

## 2024-09-06 RX ORDER — BENZONATATE 100 MG/1
100 CAPSULE ORAL 3 TIMES DAILY PRN
Qty: 30 CAPSULE | Refills: 0 | Status: SHIPPED | OUTPATIENT
Start: 2024-09-06 | End: 2024-09-16

## 2024-09-06 RX ORDER — ALBUTEROL SULFATE 0.83 MG/ML
2.5 SOLUTION RESPIRATORY (INHALATION)
Status: COMPLETED | OUTPATIENT
Start: 2024-09-06 | End: 2024-09-06

## 2024-09-06 RX ADMIN — ALBUTEROL SULFATE 2.5 MG: 0.83 SOLUTION RESPIRATORY (INHALATION) at 09:09

## 2024-09-06 RX ADMIN — DEXAMETHASONE SODIUM PHOSPHATE 10 MG: 10 INJECTION INTRAMUSCULAR; INTRAVENOUS at 09:09

## 2024-09-06 NOTE — PROGRESS NOTES
Subjective:      Patient ID: Danish Guzman is a 34 y.o. male.    Vitals:  height is 6' (1.829 m) and weight is 108.9 kg (240 lb). His oral temperature is 98.1 °F (36.7 °C). His blood pressure is 136/81 and his pulse is 66. His respiration is 16 and oxygen saturation is 96%.     Chief Complaint: Cough    Patient presents with c.o cough x 2 weeks. Cough is productive of sputum. Patient reports some sob and wheezing. Hx of asthma as a child. No fever in the last few days.           34-year-old male presents to clinic with complaints of cough x 2 weeks, nasal congestion, chest congestion and tightness in the center of his chest, shortness of breath and wheezing; treating with Mucinex without resolve.  Denies recent travel. Works in a restaurant.    Cough  This is a new problem. Episode onset: 2 weeks. The problem has been unchanged. The problem occurs every few minutes. The cough is Productive of sputum. Associated symptoms include myalgias. Pertinent negatives include no chills or fever. Nothing aggravates the symptoms. Treatments tried: mucinex. The treatment provided no relief. His past medical history is significant for asthma and bronchitis. There is no history of pneumonia.       Constitution: Negative for chills, sweating, fatigue and fever.   HENT:  Positive for congestion.    Respiratory:  Positive for chest tightness and cough.         Childhood asthma   Musculoskeletal:  Positive for muscle ache.      Objective:     Physical Exam   Constitutional: He is oriented to person, place, and time. He appears well-developed. He is cooperative.  Non-toxic appearance. He does not appear ill. No distress.   HENT:   Head: Normocephalic and atraumatic.   Ears:   Right Ear: Hearing, tympanic membrane, external ear and ear canal normal.   Left Ear: Hearing, tympanic membrane, external ear and ear canal normal.   Nose: Mucosal edema present. No rhinorrhea or nasal deformity. No epistaxis. Right sinus exhibits no maxillary  sinus tenderness and no frontal sinus tenderness. Left sinus exhibits no maxillary sinus tenderness and no frontal sinus tenderness.   Mouth/Throat: Uvula is midline, oropharynx is clear and moist and mucous membranes are normal. No trismus in the jaw. Normal dentition. No uvula swelling. No oropharyngeal exudate, posterior oropharyngeal edema or posterior oropharyngeal erythema.   Eyes: Lids are normal. No scleral icterus. Extraocular movement intact   Neck: Trachea normal and phonation normal. Neck supple. No edema present. No erythema present. No neck rigidity present.   Cardiovascular: Normal rate, regular rhythm, normal heart sounds and normal pulses.   Pulmonary/Chest: Effort normal. No respiratory distress. He has decreased breath sounds. He has no rhonchi.   Scattered crackles via bronchial region with cough         Comments: Scattered crackles via bronchial region with cough    Abdominal: Normal appearance.   Musculoskeletal: Normal range of motion.         General: No deformity. Normal range of motion.   Neurological: He is alert and oriented to person, place, and time. He exhibits normal muscle tone. Coordination normal.   Skin: Skin is warm, dry, intact, not diaphoretic and not pale.   Psychiatric: His speech is normal and behavior is normal. Judgment and thought content normal.   Nursing note and vitals reviewed.    States less chest tightness after breathing treatment    Assessment:     1. Bronchitis    2. Acute cough    3. Nasal congestion    4. Shortness of breath    5. Chest congestion    6. Decreased lung sounds    7. Wheezing        Plan:       Bronchitis  -     dexAMETHasone injection 10 mg  -     predniSONE (DELTASONE) 20 MG tablet; Take 1 tablet (20 mg total) by mouth once daily. for 3 days  Dispense: 3 tablet; Refill: 0    Acute cough  -     XR CHEST PA AND LATERAL; Future; Expected date: 09/06/2024  -     albuterol nebulizer solution 2.5 mg  -     benzonatate (TESSALON) 100 MG capsule; Take 1  capsule (100 mg total) by mouth 3 (three) times daily as needed for Cough.  Dispense: 30 capsule; Refill: 0    Nasal congestion  -     fluticasone propionate (FLONASE) 50 mcg/actuation nasal spray; 2 sprays (100 mcg total) by Each Nostril route daily as needed (nasal congestion).  Dispense: 15.8 mL; Refill: 0    Shortness of breath  -     XR CHEST PA AND LATERAL; Future; Expected date: 09/06/2024  -     albuterol nebulizer solution 2.5 mg    Chest congestion  -     albuterol nebulizer solution 2.5 mg    Decreased lung sounds    Wheezing  -     albuterol (VENTOLIN HFA) 90 mcg/actuation inhaler; Inhale 2 puffs into the lungs every 6 (six) hours as needed for Wheezing. Rescue  Dispense: 18 g; Refill: 0    XR CHEST PA AND LATERAL    Result Date: 9/6/2024  EXAMINATION: XR CHEST PA AND LATERAL CLINICAL HISTORY: Acute cough TECHNIQUE: PA and lateral views of the chest were performed. COMPARISON: March 16, 2023 FINDINGS: Heart size pulmonary vessels are normal.  Lungs are well aerated and clear.  No pneumothorax.  No significant pleural fluid.  Bones are intact.     No significant abnormality identified and no detrimental change. Electronically signed by: Aime Lobo MD Date:    09/06/2024 Time:    09:27      Reviewed xray with patient who acknowledged results. Discussed  Diagnosis and treatment plan with patient who verbalized understanding and agrees with plan of care.  Advised on the need to call and schedule a follow-up appointment with pcp no resolve. Patient given educational handouts supporting this diagnosis as well.  Patient denies any further questions or concerns at this time.  Patient exits exam room in no acute distress.             Patient Instructions   Do not smoke or be in smoke-filled places. Avoid other things that may cause breathing problems like fumes, pollution, dust, and other common allergens.  Drink lots of water, juice, or broth to replace fluids lost in runny nose and fever.  If you have  medicines to take when you are feeling short of breath, be sure to carry them with you. Then, you can take them when needed.  If you smoke, stop.  Take warm, steamy showers to help soothe the cough.  Use a cool mist humidifier. This may make it easier to breathe.  Use hard candy or cough drops to soothe sore throat and cough.  Wash your hands often. This will help prevent you from spreading germs to others.  Please get plenty of rest.  Please return here or go to the Emergency Department for any concerns or worsening of condition.  You were given a steroid shot in the clinic and have also been given a prescription for a steroid such as Prednisone or a Medrol Dose Pack, please begin taking them tomorrow.  We recommend you take Flonase (Fluticasone) or another nasally inhaled steroid unless you are already taking one.  Nasal irrigation with a saline spray or Netti Pot like device per their directions is also recommended.  If not allergic, please take over the counter Tylenol (Acetaminophen) and/or Motrin (Ibuprofen) as directed for control of pain and/or fever.    Please follow up with your primary care doctor or specialist as needed.

## 2024-09-06 NOTE — PATIENT INSTRUCTIONS
Do not smoke or be in smoke-filled places. Avoid other things that may cause breathing problems like fumes, pollution, dust, and other common allergens.  Drink lots of water, juice, or broth to replace fluids lost in runny nose and fever.  If you have medicines to take when you are feeling short of breath, be sure to carry them with you. Then, you can take them when needed.  If you smoke, stop.  Take warm, steamy showers to help soothe the cough.  Use a cool mist humidifier. This may make it easier to breathe.  Use hard candy or cough drops to soothe sore throat and cough.  Wash your hands often. This will help prevent you from spreading germs to others.  Please get plenty of rest.  Please return here or go to the Emergency Department for any concerns or worsening of condition.  You were given a steroid shot in the clinic and have also been given a prescription for a steroid such as Prednisone or a Medrol Dose Pack, please begin taking them tomorrow.  We recommend you take Flonase (Fluticasone) or another nasally inhaled steroid unless you are already taking one.  Nasal irrigation with a saline spray or Netti Pot like device per their directions is also recommended.  If not allergic, please take over the counter Tylenol (Acetaminophen) and/or Motrin (Ibuprofen) as directed for control of pain and/or fever.    Please follow up with your primary care doctor or specialist as needed.

## 2025-04-16 ENCOUNTER — OFFICE VISIT (OUTPATIENT)
Dept: URGENT CARE | Facility: CLINIC | Age: 35
End: 2025-04-16
Payer: COMMERCIAL

## 2025-04-16 VITALS
TEMPERATURE: 98 F | OXYGEN SATURATION: 98 % | HEART RATE: 77 BPM | HEIGHT: 72 IN | RESPIRATION RATE: 18 BRPM | DIASTOLIC BLOOD PRESSURE: 81 MMHG | WEIGHT: 240 LBS | BODY MASS INDEX: 32.51 KG/M2 | SYSTOLIC BLOOD PRESSURE: 144 MMHG

## 2025-04-16 DIAGNOSIS — J01.10 ACUTE FRONTAL SINUSITIS, RECURRENCE NOT SPECIFIED: Primary | ICD-10-CM

## 2025-04-16 DIAGNOSIS — J34.89 TENDERNESS OVER FRONTAL SINUS: ICD-10-CM

## 2025-04-16 DIAGNOSIS — J34.89 RHINORRHEA: ICD-10-CM

## 2025-04-16 DIAGNOSIS — R09.81 NASAL CONGESTION: ICD-10-CM

## 2025-04-16 DIAGNOSIS — J02.9 SORE THROAT: ICD-10-CM

## 2025-04-16 LAB
CTP QC/QA: YES
SARS CORONAVIRUS 2 ANTIGEN: NEGATIVE

## 2025-04-16 RX ORDER — DEXAMETHASONE SODIUM PHOSPHATE 10 MG/ML
10 INJECTION INTRAMUSCULAR; INTRAVENOUS
Status: COMPLETED | OUTPATIENT
Start: 2025-04-16 | End: 2025-04-16

## 2025-04-16 RX ORDER — AMOXICILLIN AND CLAVULANATE POTASSIUM 875; 125 MG/1; MG/1
1 TABLET, FILM COATED ORAL EVERY 12 HOURS
Qty: 14 TABLET | Refills: 0 | Status: SHIPPED | OUTPATIENT
Start: 2025-04-16 | End: 2025-04-23

## 2025-04-16 RX ADMIN — DEXAMETHASONE SODIUM PHOSPHATE 10 MG: 10 INJECTION INTRAMUSCULAR; INTRAVENOUS at 09:04

## 2025-04-16 NOTE — PATIENT INSTRUCTIONS
Please drink plenty of fluids.  Please get plenty of rest.  Please return here or go to the Emergency Department for any concerns or worsening of condition.  It is ok to take over the counter plain Allegra or Claritin or Zyrtec.   If you do have Hypertension or palpitations, it is safe to take Coricidin HBP for relief of sinus symptoms.  We recommend you take Flonase (Fluticasone) or another nasally inhaled steroid unless you are already taking one.  Nasal irrigation with a saline spray or Netti Pot like device per their directions is also recommended.  You were prescribed antibiotics, please take them until completion as directed.  If not allergic, please take over the counter Tylenol (Acetaminophen) and/or Motrin (Ibuprofen) as directed for control of pain and/or fever.  Please follow up with your primary care doctor or specialist as needed.    If you  smoke, please stop smoking.

## 2025-04-16 NOTE — PROGRESS NOTES
Subjective:      Patient ID: Danish Guzman is a 34 y.o. male.    Vitals:  height is 6' (1.829 m) and weight is 108.9 kg (240 lb). His tympanic temperature is 98 °F (36.7 °C). His blood pressure is 144/81 (abnormal) and his pulse is 77. His respiration is 18 and oxygen saturation is 98%.     Chief Complaint: Sore Throat (Entered by patient)    Patient presents with c.o sinus drainage x 2 days. Patient also complains of a runny nose, cough, and sore throat. Patient is supposed to go to a wedding. Agrees to covid testing in clinic. No fever.      34-year-old male presents to clinic with complaints of frontal sinus pressure, thick nasal secretions, cough and sore throat x 2 days. Treating with OTC combination package of claritin and nasal spray      Sinus Problem  This is a new problem. Episode onset: 2 days. The problem is unchanged. There has been no fever. Associated symptoms include congestion, coughing, sinus pressure, sneezing and a sore throat. Pertinent negatives include no chills or diaphoresis. Past treatments include nothing.       Constitution: Negative for chills, sweating and fatigue.   HENT:  Positive for congestion, postnasal drip, sinus pain, sinus pressure and sore throat. Negative for trouble swallowing and voice change.    Neck: Negative for painful lymph nodes.   Respiratory:  Positive for cough.    Gastrointestinal:  Negative for abdominal pain.   Allergic/Immunologic: Positive for sneezing.   Hematologic/Lymphatic: Negative for swollen lymph nodes.      Objective:     Physical Exam   Constitutional: He is oriented to person, place, and time. He appears well-developed. He is cooperative.  Non-toxic appearance. He does not appear ill. No distress.   HENT:   Head: Normocephalic and atraumatic.   Ears:   Right Ear: Hearing, tympanic membrane, external ear and ear canal normal. No tenderness.   Left Ear: Hearing, tympanic membrane, external ear and ear canal normal. No tenderness.   Nose: Mucosal  edema and rhinorrhea present. No nasal deformity. No epistaxis. Right sinus exhibits frontal sinus tenderness. Right sinus exhibits no maxillary sinus tenderness. Left sinus exhibits frontal sinus tenderness. Left sinus exhibits no maxillary sinus tenderness.   Mouth/Throat: Uvula is midline, oropharynx is clear and moist and mucous membranes are normal. No trismus in the jaw. Normal dentition. No uvula swelling. No oropharyngeal exudate, posterior oropharyngeal edema or posterior oropharyngeal erythema.   Eyes: Conjunctivae and lids are normal. No scleral icterus.   Neck: Trachea normal and phonation normal. Neck supple. No edema present. No erythema present. No neck rigidity present.   Cardiovascular: Normal rate, regular rhythm and normal heart sounds.   Pulmonary/Chest: Effort normal and breath sounds normal. No respiratory distress. He has no decreased breath sounds. He has no rhonchi.   Abdominal: Normal appearance.   Musculoskeletal: Normal range of motion.         General: No deformity. Normal range of motion.   Neurological: He is alert and oriented to person, place, and time. He exhibits normal muscle tone. Coordination normal.   Skin: Skin is warm, dry, intact, not diaphoretic and not pale.   Psychiatric: His speech is normal and behavior is normal. Judgment and thought content normal.   Nursing note and vitals reviewed.      Assessment:     1. Acute frontal sinusitis, recurrence not specified    2. Sore throat    3. Nasal congestion    4. Rhinorrhea    5. Tenderness over frontal sinus      Results for orders placed or performed in visit on 04/16/25   SARS Coronavirus 2 Antigen, POCT Manual Read    Collection Time: 04/16/25  9:39 AM   Result Value Ref Range    SARS Coronavirus 2 Antigen Negative Negative, Presumptive Negative     Acceptable Yes       Plan:       Acute frontal sinusitis, recurrence not specified  -     amoxicillin-clavulanate 875-125mg (AUGMENTIN) 875-125 mg per tablet; Take  1 tablet by mouth every 12 (twelve) hours. for 7 days  Dispense: 14 tablet; Refill: 0    Sore throat  -     SARS Coronavirus 2 Antigen, POCT Manual Read  -     dexAMETHasone injection 10 mg    Nasal congestion    Rhinorrhea    Tenderness over frontal sinus  -     dexAMETHasone injection 10 mg      Patient Instructions   Please drink plenty of fluids.  Please get plenty of rest.  Please return here or go to the Emergency Department for any concerns or worsening of condition.  It is ok to take over the counter plain Allegra or Claritin or Zyrtec.   If you do have Hypertension or palpitations, it is safe to take Coricidin HBP for relief of sinus symptoms.  We recommend you take Flonase (Fluticasone) or another nasally inhaled steroid unless you are already taking one.  Nasal irrigation with a saline spray or Netti Pot like device per their directions is also recommended.  You were prescribed antibiotics, please take them until completion as directed.  If not allergic, please take over the counter Tylenol (Acetaminophen) and/or Motrin (Ibuprofen) as directed for control of pain and/or fever.  Please follow up with your primary care doctor or specialist as needed.    If you  smoke, please stop smoking.

## 2025-06-09 ENCOUNTER — OFFICE VISIT (OUTPATIENT)
Dept: INTERNAL MEDICINE | Facility: CLINIC | Age: 35
End: 2025-06-09
Payer: COMMERCIAL

## 2025-06-09 ENCOUNTER — HOSPITAL ENCOUNTER (OUTPATIENT)
Dept: RADIOLOGY | Facility: HOSPITAL | Age: 35
Discharge: HOME | End: 2025-06-09
Attending: FAMILY MEDICINE
Payer: COMMERCIAL

## 2025-06-09 VITALS
BODY MASS INDEX: 33.62 KG/M2 | WEIGHT: 248.25 LBS | HEART RATE: 71 BPM | HEIGHT: 72 IN | OXYGEN SATURATION: 97 % | SYSTOLIC BLOOD PRESSURE: 132 MMHG | DIASTOLIC BLOOD PRESSURE: 74 MMHG

## 2025-06-09 DIAGNOSIS — R05.3 CHRONIC COUGH: ICD-10-CM

## 2025-06-09 DIAGNOSIS — E66.09 CLASS 1 OBESITY DUE TO EXCESS CALORIES WITHOUT SERIOUS COMORBIDITY WITH BODY MASS INDEX (BMI) OF 33.0 TO 33.9 IN ADULT: ICD-10-CM

## 2025-06-09 DIAGNOSIS — Z76.89 ENCOUNTER TO ESTABLISH CARE WITH NEW DOCTOR: ICD-10-CM

## 2025-06-09 DIAGNOSIS — R03.0 ELEVATED BLOOD PRESSURE READING WITHOUT DIAGNOSIS OF HYPERTENSION: ICD-10-CM

## 2025-06-09 DIAGNOSIS — E66.811 CLASS 1 OBESITY DUE TO EXCESS CALORIES WITHOUT SERIOUS COMORBIDITY WITH BODY MASS INDEX (BMI) OF 33.0 TO 33.9 IN ADULT: ICD-10-CM

## 2025-06-09 DIAGNOSIS — R05.3 CHRONIC COUGH: Primary | ICD-10-CM

## 2025-06-09 PROCEDURE — 3078F DIAST BP <80 MM HG: CPT | Mod: CPTII,S$GLB,, | Performed by: FAMILY MEDICINE

## 2025-06-09 PROCEDURE — 3075F SYST BP GE 130 - 139MM HG: CPT | Mod: CPTII,S$GLB,, | Performed by: FAMILY MEDICINE

## 2025-06-09 PROCEDURE — 99999 PR PBB SHADOW E&M-EST. PATIENT-LVL III: CPT | Mod: PBBFAC,,, | Performed by: FAMILY MEDICINE

## 2025-06-09 PROCEDURE — 71046 X-RAY EXAM CHEST 2 VIEWS: CPT | Mod: TC

## 2025-06-09 PROCEDURE — 1160F RVW MEDS BY RX/DR IN RCRD: CPT | Mod: CPTII,S$GLB,, | Performed by: FAMILY MEDICINE

## 2025-06-09 PROCEDURE — 1159F MED LIST DOCD IN RCRD: CPT | Mod: CPTII,S$GLB,, | Performed by: FAMILY MEDICINE

## 2025-06-09 PROCEDURE — 71046 X-RAY EXAM CHEST 2 VIEWS: CPT | Mod: 26,,, | Performed by: RADIOLOGY

## 2025-06-09 PROCEDURE — 99214 OFFICE O/P EST MOD 30 MIN: CPT | Mod: S$GLB,,, | Performed by: FAMILY MEDICINE

## 2025-06-09 PROCEDURE — 3008F BODY MASS INDEX DOCD: CPT | Mod: CPTII,S$GLB,, | Performed by: FAMILY MEDICINE

## 2025-06-09 RX ORDER — BENZONATATE 100 MG/1
100 CAPSULE ORAL 3 TIMES DAILY PRN
Qty: 46 CAPSULE | Refills: 0 | Status: SHIPPED | OUTPATIENT
Start: 2025-06-09 | End: 2025-06-24

## 2025-06-09 RX ORDER — AZITHROMYCIN 250 MG/1
TABLET, FILM COATED ORAL
Qty: 6 TABLET | Refills: 0 | Status: SHIPPED | OUTPATIENT
Start: 2025-06-09 | End: 2025-06-14

## 2025-06-09 RX ORDER — PROMETHAZINE HYDROCHLORIDE AND DEXTROMETHORPHAN HYDROBROMIDE 6.25; 15 MG/5ML; MG/5ML
5 SYRUP ORAL EVERY 6 HOURS PRN
Qty: 473 ML | Refills: 0 | Status: SHIPPED | OUTPATIENT
Start: 2025-06-09 | End: 2025-07-03

## 2025-06-09 NOTE — PROGRESS NOTES
[facility]  Primary Care Office Visit    Patient Information  Danish Guzman  34 y.o. male    PCP  Madan Interiano MD    Reason for Visit  No chief complaint on file.      Subjective:  History of Present Illness    CHIEF COMPLAINT:  Patient presents today for persistent cough of two months duration.    He has experienced a waxing and waning cough for two months with occasional light green mucus production. The cough is worse at night with associated throat irritation, affecting his sleep. He denies facial pain or pressure. He visited urgent care two months ago where he was diagnosed with a virus and prescribed steroids and nasal spray. The steroids provided partial relief. He currently takes Mucinex with minimal symptom improvement. He traveled to [place] for a wedding 6-8 weeks ago prior to onset of symptoms.    He has intermittent elevated blood pressure but denies prior use of antihypertensive medications. He denies history of asthma.         Review of Systems   Constitutional:  Negative for chills, fever and night sweats.   HENT:  Negative for nasal congestion, ear pain and sore throat.    Eyes:  Negative for visual disturbance.   Respiratory:  Positive for cough. Negative for shortness of breath and wheezing.    Cardiovascular:  Negative for chest pain.   Gastrointestinal:  Negative for constipation, diarrhea, nausea and vomiting.   Genitourinary:  Negative for dysuria and frequency.   Integumentary:  Negative for rash.   Neurological:  Negative for headaches.   Psychiatric/Behavioral:  Negative for suicidal ideas.    All other systems reviewed and are negative.       Patient answers are not available for this visit.      Problem List and History  Problem List[1]  Past Medical History[2]  Past Surgical History[3]    Medication List  Current Medications[4]    Objective:  Vitals:    06/09/25 1431 06/09/25 1713   BP: (!) 142/84 132/74   Pulse: 71    SpO2: 97%    Weight: 112.6 kg  (248 lb 3.8 oz)    Height: 6' (1.829 m)        Physical Exam  Constitutional:       General: He is not in acute distress.     Appearance: Normal appearance. He is obese. He is not ill-appearing.   HENT:      Head: Normocephalic and atraumatic.      Right Ear: External ear normal. No middle ear effusion.      Left Ear: External ear normal.  No middle ear effusion.      Nose: Nose normal. No congestion or rhinorrhea.      Mouth/Throat:      Mouth: Mucous membranes are moist.      Pharynx: Oropharynx is clear. No oropharyngeal exudate or posterior oropharyngeal erythema.   Eyes:      General: No scleral icterus.        Right eye: No discharge.         Left eye: No discharge.      Conjunctiva/sclera: Conjunctivae normal.   Cardiovascular:      Rate and Rhythm: Normal rate and regular rhythm.      Heart sounds: Normal heart sounds. No murmur heard.     No friction rub. No gallop.   Pulmonary:      Effort: Pulmonary effort is normal. No respiratory distress.      Breath sounds: Normal breath sounds. No wheezing, rhonchi or rales.   Musculoskeletal:         General: No swelling or deformity. Normal range of motion.      Cervical back: Normal range of motion and neck supple.   Lymphadenopathy:      Cervical: No cervical adenopathy.   Skin:     General: Skin is warm and dry.   Neurological:      General: No focal deficit present.      Mental Status: He is alert and oriented to person, place, and time. Mental status is at baseline.   Psychiatric:         Mood and Affect: Mood normal.         Behavior: Behavior normal.         Thought Content: Thought content normal.         Judgment: Judgment normal.         Assessment and Plan:    ICD-10-CM ICD-9-CM   1. Chronic cough  R05.3 786.2   2. Class 1 obesity due to excess calories without serious comorbidity with body mass index (BMI) of 33.0 to 33.9 in adult  E66.811 278.00    E66.09 V85.33    Z68.33    3. Encounter to establish care with new doctor  Z76.89 V65.8   4. Elevated  blood pressure reading without diagnosis of hypertension  R03.0 796.2     Orders Placed This Encounter    X-Ray Chest PA And Lateral    Comprehensive Metabolic Panel    CBC Auto Differential    Lipid Panel    Hemoglobin A1C    TSH    T4, Free    Quantiferon Gold TB    promethazine-dextromethorphan (PROMETHAZINE-DM) 6.25-15 mg/5 mL Syrp    benzonatate (TESSALON) 100 MG capsule    azithromycin (Z-KAN) 250 MG tablet       Assessment & Plan    - Assessed persistent cough for 2 months, lungs clear on exam.  - Considered atypical pneumonia or prolonged bronchitis as potential diagnoses.    1. Chronic cough (Primary)  Assessment as above, exam reassuring  Ordering CXR and Quant Gold today  Sx mgmt w/ promethazine-DM, tessalon  May also use OTC meds, careful of DM content  Rx zpack to have on hand should sx worsen or if CXR results merit abx  Advised pt not to take this unless advised to by us  - X-Ray Chest PA And Lateral; Future  - Quantiferon Gold TB; Future  - promethazine-dextromethorphan (PROMETHAZINE-DM) 6.25-15 mg/5 mL Syrp; Take 5 mLs by mouth every 6 (six) hours as needed (cough).  Dispense: 473 mL; Refill: 0  - benzonatate (TESSALON) 100 MG capsule; Take 1 capsule (100 mg total) by mouth 3 (three) times daily as needed for Cough.  Dispense: 46 capsule; Refill: 0  - azithromycin (Z-KAN) 250 MG tablet; Take 2 tablets by mouth on day 1; Take 1 tablet by mouth on days 2-5  Dispense: 6 tablet; Refill: 0    2. Class 1 obesity due to excess calories without serious comorbidity with body mass index (BMI) of 33.0 to 33.9 in adult  Encourage healthy diet and exercise habits to optimize health and minimize chance of comorbidity development    3. Encounter to establish care with new doctor  Health maintenance updated  Chronic issues reviewed  Fasting labs ordered  - Comprehensive Metabolic Panel; Future  - CBC Auto Differential; Future  - Lipid Panel; Future  - Hemoglobin A1C; Future  - TSH; Future  - T4, Free; Future    4.  Elevated blood pressure reading without diagnosis of hypertension  Elevated today, will need monitoring  F/u BP when he returns for physical      Check-Out:  Follow-Up  Follow up in about 4 weeks (around 7/7/2025) for Annual Visit.    Upcoming Appointments  Future Appointments   Date Time Provider Department Center   7/8/2025  1:40 PM Madan Interiano MD [facility] [address]         Madan Interiano MD, FAAFP  Family Medicine Physician  [facility]  6/9/2025      This note was generated with the assistance of ambient listening technology. Verbal consent was obtained by the patient and accompanying visitor(s) for the recording of patient appointment to facilitate this note. I attest to having reviewed and edited the generated note for accuracy, though some syntax or spelling errors may persist. Please contact the author of this note for any clarification.                                   [1]   Patient Active Problem List  Diagnosis    Elevated blood pressure reading without diagnosis of hypertension   [2] No past medical history on file.  [3]   Past Surgical History:  Procedure Laterality Date    TONSILLECTOMY Bilateral    [4]   Current Outpatient Medications:     azithromycin (Z-KAN) 250 MG tablet, Take 2 tablets by mouth on day 1; Take 1 tablet by mouth on days 2-5, Disp: 6 tablet, Rfl: 0    benzonatate (TESSALON) 100 MG capsule, Take 1 capsule (100 mg total) by mouth 3 (three) times daily as needed for Cough., Disp: 46 capsule, Rfl: 0    promethazine-dextromethorphan (PROMETHAZINE-DM) 6.25-15 mg/5 mL Syrp, Take 5 mLs by mouth every 6 (six) hours as needed (cough)., Disp: 473 mL, Rfl: 0

## 2025-07-08 ENCOUNTER — OFFICE VISIT (OUTPATIENT)
Dept: INTERNAL MEDICINE | Facility: CLINIC | Age: 35
End: 2025-07-08
Payer: COMMERCIAL

## 2025-07-08 VITALS
HEIGHT: 72 IN | OXYGEN SATURATION: 97 % | WEIGHT: 241.38 LBS | BODY MASS INDEX: 32.69 KG/M2 | SYSTOLIC BLOOD PRESSURE: 138 MMHG | HEART RATE: 55 BPM | DIASTOLIC BLOOD PRESSURE: 78 MMHG

## 2025-07-08 DIAGNOSIS — E66.09 CLASS 1 OBESITY DUE TO EXCESS CALORIES WITHOUT SERIOUS COMORBIDITY WITH BODY MASS INDEX (BMI) OF 32.0 TO 32.9 IN ADULT: ICD-10-CM

## 2025-07-08 DIAGNOSIS — Z00.00 ENCOUNTER FOR ANNUAL GENERAL MEDICAL EXAMINATION WITHOUT ABNORMAL FINDINGS IN ADULT: Primary | ICD-10-CM

## 2025-07-08 DIAGNOSIS — E66.811 CLASS 1 OBESITY DUE TO EXCESS CALORIES WITHOUT SERIOUS COMORBIDITY WITH BODY MASS INDEX (BMI) OF 32.0 TO 32.9 IN ADULT: ICD-10-CM

## 2025-07-08 DIAGNOSIS — R05.3 CHRONIC COUGH: ICD-10-CM

## 2025-07-08 PROCEDURE — 1160F RVW MEDS BY RX/DR IN RCRD: CPT | Mod: CPTII,S$GLB,, | Performed by: FAMILY MEDICINE

## 2025-07-08 PROCEDURE — 3044F HG A1C LEVEL LT 7.0%: CPT | Mod: CPTII,S$GLB,, | Performed by: FAMILY MEDICINE

## 2025-07-08 PROCEDURE — 3008F BODY MASS INDEX DOCD: CPT | Mod: CPTII,S$GLB,, | Performed by: FAMILY MEDICINE

## 2025-07-08 PROCEDURE — 3078F DIAST BP <80 MM HG: CPT | Mod: CPTII,S$GLB,, | Performed by: FAMILY MEDICINE

## 2025-07-08 PROCEDURE — 1159F MED LIST DOCD IN RCRD: CPT | Mod: CPTII,S$GLB,, | Performed by: FAMILY MEDICINE

## 2025-07-08 PROCEDURE — 3075F SYST BP GE 130 - 139MM HG: CPT | Mod: CPTII,S$GLB,, | Performed by: FAMILY MEDICINE

## 2025-07-08 PROCEDURE — 99999 PR PBB SHADOW E&M-EST. PATIENT-LVL III: CPT | Mod: PBBFAC,,, | Performed by: FAMILY MEDICINE

## 2025-07-08 PROCEDURE — 99395 PREV VISIT EST AGE 18-39: CPT | Mod: S$GLB,,, | Performed by: FAMILY MEDICINE

## 2025-07-08 NOTE — PROGRESS NOTES
[facility]  Annual Exam/Wellness Visit    Patient Information  Danish Guzman  35 y.o. male    PCP  Madan Interiano MD    Reason for Visit  Follow-up      Subjective:  History of Present Illness    CHIEF COMPLAINT:  Patient presents today for annual exam and to review lab results.    He reports improvement in recent cough and has completed prescribed cough medication. He did not need to  just-in-case initiate antibiotics.    Quantiferon gold test was negative, ruling out active tuberculosis.     Mother has history of multiple strokes.    He is a non-smoker and does not vape.    He is not currently taking any medications.         Health Maintenance         Date Due Completion Date    Pneumococcal Vaccines (Age 0-49) (1 of 2 - PCV) Never done ---    COVID-19 Vaccine (5 - 2024-25 season) 09/01/2024 1/24/2023    Influenza Vaccine (1) 09/01/2025 1/24/2023    TETANUS VACCINE 04/04/2033 4/4/2023    RSV Vaccine (Age 60+ and Pregnant patients) (1 - 1-dose 75+ series) 06/20/2066 ---            Review of Systems   Constitutional:  Negative for chills, fatigue and fever.   HENT:  Negative for nasal congestion, ear pain, postnasal drip and sore throat.    Eyes:  Negative for visual disturbance.   Respiratory:  Negative for cough, shortness of breath and wheezing.    Cardiovascular:  Negative for chest pain and palpitations.   Gastrointestinal:  Negative for abdominal pain, change in bowel habit, constipation, diarrhea, nausea and vomiting.   Genitourinary:  Negative for dysuria and hematuria.   Musculoskeletal:  Negative for back pain, leg pain and neck pain.   Neurological:  Negative for dizziness, numbness and headaches.   Hematological:  Negative for adenopathy.   Psychiatric/Behavioral:  Negative for dysphoric mood, sleep disturbance and suicidal ideas. The patient is not nervous/anxious.         Patient answers are not available for this visit.      Problem List and History  Problem  List[1]  Past Medical History[2]  Past Surgical History[3]  Social History     Substance and Sexual Activity   Sexual Activity Yes    Partners: Female     Tobacco Use History[4]  Social History     Substance and Sexual Activity   Alcohol Use Yes    Alcohol/week: 3.0 standard drinks of alcohol    Types: 1 Glasses of wine, 1 Cans of beer, 1 Shots of liquor per week    Comment: OCCASIONALLY     Social History     Substance and Sexual Activity   Drug Use Never       Medication List  Current Medications[5]    Objective:  Vitals:    07/08/25 1340   BP: 138/78   Pulse: (!) 55   SpO2: 97%   Weight: 109.5 kg (241 lb 6.5 oz)   Height: 6' (1.829 m)       Physical Exam  Vitals reviewed.   Constitutional:       General: He is not in acute distress.     Appearance: Normal appearance. He is obese. He is not ill-appearing.   HENT:      Head: Normocephalic and atraumatic.      Right Ear: Tympanic membrane, ear canal and external ear normal. There is no impacted cerumen.      Left Ear: Tympanic membrane, ear canal and external ear normal. There is no impacted cerumen.      Nose: Nose normal. No congestion or rhinorrhea.      Mouth/Throat:      Mouth: Mucous membranes are moist.      Pharynx: Oropharynx is clear. No oropharyngeal exudate or posterior oropharyngeal erythema.   Eyes:      General: No scleral icterus.        Right eye: No discharge.         Left eye: No discharge.      Conjunctiva/sclera: Conjunctivae normal.   Neck:      Thyroid: No thyroid mass, thyromegaly or thyroid tenderness.   Cardiovascular:      Rate and Rhythm: Normal rate and regular rhythm.      Pulses: Normal pulses.      Heart sounds: Normal heart sounds. No murmur heard.     No friction rub. No gallop.   Pulmonary:      Effort: Pulmonary effort is normal. No respiratory distress.      Breath sounds: Normal breath sounds. No stridor. No wheezing, rhonchi or rales.   Abdominal:      General: Abdomen is flat. Bowel sounds are normal. There is no distension.       Palpations: Abdomen is soft. There is no mass.      Tenderness: There is no abdominal tenderness. There is no guarding.      Hernia: No hernia is present.   Musculoskeletal:         General: No swelling or deformity. Normal range of motion.      Cervical back: Normal range of motion and neck supple. No tenderness.   Lymphadenopathy:      Cervical: No cervical adenopathy.   Skin:     General: Skin is warm and dry.   Neurological:      General: No focal deficit present.      Mental Status: He is alert and oriented to person, place, and time. Mental status is at baseline.      Gait: Gait normal.      Deep Tendon Reflexes: Reflexes normal.   Psychiatric:         Mood and Affect: Mood normal.         Behavior: Behavior normal.         Thought Content: Thought content normal.         Judgment: Judgment normal.         Lab Results  CBC  Lab Results   Component Value Date    WBC 8.49 06/09/2025    RBC 5.28 06/09/2025    HGB 15.0 06/09/2025    HCT 45.5 06/09/2025    MCV 86 06/09/2025    MCH 28.4 06/09/2025    MCHC 33.0 06/09/2025    RDW 12.6 06/09/2025     06/09/2025    MPV 10.8 06/09/2025    GRAN 4.5 04/04/2023    GRAN 57.6 04/04/2023    LYMPH 21.3 06/09/2025    LYMPH 1.81 06/09/2025    MONO 8.0 06/09/2025    MONO 0.68 06/09/2025    EOS 6.5 06/09/2025    EOS 0.55 (H) 06/09/2025    BASO 0.06 04/04/2023    EOSINOPHIL 4.4 04/04/2023    BASOPHIL 0.7 06/09/2025    BASOPHIL 0.06 06/09/2025       CMP    Chemistry        Component Value Date/Time     06/09/2025 1509     04/04/2023 0845    K 4.0 06/09/2025 1509    K 4.1 04/04/2023 0845     06/09/2025 1509     04/04/2023 0845    CO2 22 (L) 06/09/2025 1509    CO2 25 04/04/2023 0845    BUN 13 06/09/2025 1509    CREATININE 0.7 06/09/2025 1509    GLU 90 06/09/2025 1509    GLU 94 04/04/2023 0845        Component Value Date/Time    CALCIUM 9.3 06/09/2025 1509    CALCIUM 9.4 04/04/2023 0845    ALKPHOS 52 06/09/2025 1509    ALKPHOS 52 (L) 04/04/2023 0845     AST 23 06/09/2025 1509    AST 27 04/04/2023 0845    ALT 40 06/09/2025 1509    ALT 50 (H) 04/04/2023 0845    BILITOT 0.3 06/09/2025 1509    BILITOT 0.6 04/04/2023 0845            Lipids  Lab Results   Component Value Date    CHOL 198 06/09/2025    CHOL 182 04/04/2023      Lab Results   Component Value Date    HDL 50 06/09/2025    HDL 60 04/04/2023     Lab Results   Component Value Date    LDLCALC 119.4 06/09/2025    LDLCALC 106.8 04/04/2023      Lab Results   Component Value Date    TRIG 143 06/09/2025    TRIG 76 04/04/2023     Lab Results   Component Value Date    TOTALCHOLEST 4.0 06/09/2025    TOTALCHOLEST 3.0 04/04/2023     Lab Results   Component Value Date    NONHDLCHOL 148 06/09/2025    NONHDLCHOL 122 04/04/2023     Lab Results   Component Value Date    CHOLHDL 25.3 06/09/2025    CHOLHDL 33.0 04/04/2023       Thyroid Function  Lab Results   Component Value Date    TSH 1.316 06/09/2025    FREET4 0.89 06/09/2025         Diabetes Screen  Lab Results   Component Value Date    HGBA1C 5.3 06/09/2025       Other Labs  Lab Results   Component Value Date    [id number] Non-reactive 03/16/2023     Lab Results   Component Value Date    HEPCAB Non-reactive 03/16/2023     Imaging & Diagnostic Studies  X-Ray Chest PA And Lateral 06/09/2025    Narrative  EXAMINATION:  XR CHEST PA AND LATERAL    CLINICAL HISTORY:  Chronic cough    TECHNIQUE:  PA and lateral views of the chest were performed.    COMPARISON:  Chest x-ray 09/06/2024    FINDINGS:  Cardiac silhouette is normal.  Mediastinal structures are midline.    Lungs clear without consolidation, no pneumothorax or pleural effusion.    No significant osseous abnormality.    No pneumoperitoneum.    Impression  No acute intrathoracic abnormality.    Electronically signed by resident: Lennie Wick  Date:    06/10/2025  Time:    06:09    Electronically signed by: Jose Quiroz MD  Date:    06/10/2025  Time:    08:48      Assessment and Plan:    ICD-10-CM ICD-9-CM   1. Encounter  for annual general medical examination without abnormal findings in adult  Z00.00 V70.0   2. Class 1 obesity due to excess calories without serious comorbidity with body mass index (BMI) of 32.0 to 32.9 in adult  E66.811 278.00    E66.09 V85.32    Z68.32    3. Chronic cough  R05.3 786.2          Assessment & Plan    - Reviewed lab results.  - Quantiferon gold test for TB negative.  - Chronic cough has resolved.  - Performed annual physical exam, all findings normal.    1. Encounter for annual general medical examination without abnormal findings in adult (Primary)  Health maintenance updated  Chronic issues reviewed  Fasting labs previously completed    2. Class 1 obesity due to excess calories without serious comorbidity with body mass index (BMI) of 32.0 to 32.9 in adult  Encourage healthy diet and exercise habits to optimize health and minimize chance of comorbidity development    3. Chronic cough  Resolved      Check-Out:  Follow-Up  Follow up in about 1 year (around 7/8/2026) for Annual Visit.    Upcoming Appointments  No future appointments.      Madan Interiano MD, [facility]  Family Medicine Physician  [facility]  7/8/2025      This note was generated with the assistance of ambient listening technology. Verbal consent was obtained by the patient and accompanying visitor(s) for the recording of patient appointment to facilitate this note. I attest to having reviewed and edited the generated note for accuracy, though some syntax or spelling errors may persist. Please contact the author of this note for any clarification.                                   [1]   Patient Active Problem List  Diagnosis    Elevated blood pressure reading without diagnosis of hypertension   [2] No past medical history on file.  [3]   Past Surgical History:  Procedure Laterality Date    TONSILLECTOMY Bilateral    [4]   Social History  Tobacco Use   Smoking Status Never   Smokeless Tobacco Never   [5] No  current outpatient medications on file.

## 2025-08-14 ENCOUNTER — OFFICE VISIT (OUTPATIENT)
Dept: URGENT CARE | Facility: CLINIC | Age: 35
End: 2025-08-14
Payer: COMMERCIAL

## 2025-08-14 VITALS
SYSTOLIC BLOOD PRESSURE: 133 MMHG | OXYGEN SATURATION: 99 % | TEMPERATURE: 98 F | BODY MASS INDEX: 32.64 KG/M2 | HEART RATE: 62 BPM | RESPIRATION RATE: 20 BRPM | HEIGHT: 72 IN | DIASTOLIC BLOOD PRESSURE: 74 MMHG | WEIGHT: 241 LBS

## 2025-08-14 DIAGNOSIS — S01.512A LACERATION OF TONGUE, INITIAL ENCOUNTER: Primary | ICD-10-CM

## 2025-08-14 PROCEDURE — 99213 OFFICE O/P EST LOW 20 MIN: CPT | Mod: S$GLB,,,

## 2025-08-14 RX ORDER — CHLORHEXIDINE GLUCONATE ORAL RINSE 1.2 MG/ML
15 SOLUTION DENTAL 2 TIMES DAILY
Qty: 210 ML | Refills: 0 | Status: SHIPPED | OUTPATIENT
Start: 2025-08-14 | End: 2025-08-21